# Patient Record
Sex: FEMALE | Race: ASIAN | NOT HISPANIC OR LATINO | Employment: FULL TIME | URBAN - METROPOLITAN AREA
[De-identification: names, ages, dates, MRNs, and addresses within clinical notes are randomized per-mention and may not be internally consistent; named-entity substitution may affect disease eponyms.]

---

## 2017-03-06 ENCOUNTER — OFFICE VISIT (OUTPATIENT)
Dept: MEDICAL GROUP | Facility: LAB | Age: 57
End: 2017-03-06
Payer: COMMERCIAL

## 2017-03-06 VITALS
HEART RATE: 83 BPM | TEMPERATURE: 98.7 F | WEIGHT: 124.6 LBS | BODY MASS INDEX: 25.12 KG/M2 | RESPIRATION RATE: 12 BRPM | HEIGHT: 59 IN | SYSTOLIC BLOOD PRESSURE: 124 MMHG | OXYGEN SATURATION: 96 % | DIASTOLIC BLOOD PRESSURE: 82 MMHG

## 2017-03-06 DIAGNOSIS — E78.49 OTHER HYPERLIPIDEMIA: ICD-10-CM

## 2017-03-06 DIAGNOSIS — S20.212A RIB CONTUSION, LEFT, INITIAL ENCOUNTER: ICD-10-CM

## 2017-03-06 DIAGNOSIS — R73.03 PREDIABETES: ICD-10-CM

## 2017-03-06 DIAGNOSIS — I10 ESSENTIAL HYPERTENSION: ICD-10-CM

## 2017-03-06 PROCEDURE — 99214 OFFICE O/P EST MOD 30 MIN: CPT | Performed by: NURSE PRACTITIONER

## 2017-03-06 RX ORDER — IBUPROFEN 600 MG/1
600 TABLET ORAL EVERY 8 HOURS PRN
Qty: 60 TAB | Refills: 1 | Status: SHIPPED | OUTPATIENT
Start: 2017-03-06 | End: 2017-08-06 | Stop reason: SDUPTHER

## 2017-03-06 ASSESSMENT — PATIENT HEALTH QUESTIONNAIRE - PHQ9: CLINICAL INTERPRETATION OF PHQ2 SCORE: 0

## 2017-03-06 NOTE — PROGRESS NOTES
"Chief Complaint   Patient presents with   • Fall     patient had a fall and has pain on her left side under her breast area, hurts to cough or sneeze, onset 8 days       HPI  Jojo is a 56-year-old established female here with complaint of new onset left-sided rib pain. She reports that she fell one week ago, pulled down by her dog on a walk. She reports that she fell onto the left side of her face, partially catching herself with her left knee and hand. Only residual pain is her left anterior rib area, she points just below her breast tissue. She is not having any difficulty breathing or feeling short of breath. She has pain with coughing, sneezing and laughing. She has been using ibuprofen and is requesting a prescription strength ibuprofen as well as a topical anti-inflammatory. She does have a history of silent reflux and occasionally abdominal discomfort with ibuprofen, if she takes ibuprofen on an empty stomach.      Past medical, surgical, family, and social history is reviewed and updated in Epic chart by me today.   Medications and allergies reviewed and updated in Epic chart by me today.     ROS:   Denies nausea, vomiting, diarrhea    Exam:  Blood pressure 124/82, pulse 83, temperature 37.1 °C (98.7 °F), resp. rate 12, height 1.499 m (4' 11\"), weight 56.518 kg (124 lb 9.6 oz), SpO2 96 %.  Constitutional: Alert, no distress, plus 3 vital signs  Skin:  Warm, dry.  Well-healing scabs to her MIP joints on left hand, 2 through 5. No scabbing or bleeding to her face.  Eye: Equal, round and reactive, conjunctiva clear  ENMT: Lips without lesions, good dentition, oropharynx clear. No tenderness to facial bones bilaterally. No facial deformity.   Neck: Trachea midline  Respiratory: Unlabored respiration, lungs clear to auscultation.  No areas of decreased lung sounds. No flailing.  Cardiovascular: Normal rate   Abdomen: Soft, nontender  Musculoskeletal: She does have tenderness to her distal left anterior ribs " without overlying bruising, rash or interruption in her skin. Her pain extends to her left lateral ribs but no posterior left rib pain.  Psych: Alert, pleasant, well-groomed, normal affect    A/P:  1. Rib contusion, left, initial encounter  -Discussed that we typically refrain from x-rays with no sign of pneumothorax on exam, such as decreased lung sounds. She was comfortable with refraining from x-ray. She was requesting a note for work to allow her to refrain from lifting anything heavy for a few weeks. Discussed that she needs to rest, avoid weight lifting or any contact sports as well. Discussed bracing herself with laughing, coughing, sneezing and bowel movements. Discussed GI precautions with ibuprofen but hopefully her insurance cover diclofenac gel. Follow-up in 2-3 weeks if symptoms are not dramatically improved.  - Diclofenac Sodium 1 % Gel; Apply 1 g to skin as directed 4 times a day.  Dispense: 1 Tube; Refill: 1  - ibuprofen (MOTRIN) 600 MG Tab; Take 1 Tab by mouth every 8 hours as needed for Moderate Pain.  Dispense: 60 Tab; Refill: 1    2. Essential hypertension  -Not specifically addressed today, predicted lab work for the patient and recommend she follow up for physical in May.  -Stable today. Recommend lab work and a follow-up with Dr. Odell in May after labs    3. Other hyperlipidemia  -As above.  - COMP METABOLIC PANEL; Future  - CBC WITH DIFFERENTIAL; Future  - LIPID PROFILE; Future    4. Prediabetes  -As above.  - HEMOGLOBIN A1C; Future

## 2017-03-06 NOTE — MR AVS SNAPSHOT
"        Marilou Arias Reyes   3/6/2017 8:00 AM   Office Visit   MRN: [id number]    Department:  [facility]   Dept Phone:  [phone]    Description:  Female : 1960   Provider:  DARY Boss           Reason for Visit     Fall patient had a fall and has pain on her left side under her breast area, hurts to cough or sneeze, onset 8 days      Allergies as of 3/6/2017     No Known Allergies      You were diagnosed with     Rib contusion, left, initial encounter   [014957]       Essential hypertension   [0605142]       Other hyperlipidemia   [5384182]       Prediabetes   [361114]         Vital Signs     Blood Pressure Pulse Temperature Respirations Height Weight    124/82 mmHg 83 37.1 °C (98.7 °F) 12 1.499 m (4' 11\") 56.518 kg (124 lb 9.6 oz)    Body Mass Index Oxygen Saturation Smoking Status             25.15 kg/m2 96% Never Smoker          Basic Information     Date Of Birth Sex Race Ethnicity Preferred Language    1960 Female  Unknown English      Problem List              ICD-10-CM Priority Class Noted - Resolved    Prolactinoma (CMS-HCC) D35.2   Unknown - Present    Depression F32.9   2013 - Present    Back pain M54.9   2013 - Present    Environmental allergies Z91.09   2016 - Present    Hypertension I10   2016 - Present    Hyperlipidemia E78.5   2016 - Present    Left cervical radiculopathy M54.12   2016 - Present    Chronic neck pain M54.2, G89.29   2016 - Present    Vitamin D insufficiency E55.9   2016 - Present    Allergic rhinitis due to allergen J30.9   2016 - Present    Colon polyps K63.5   2016 - Present    Prediabetes R73.03   2016 - Present    Microalbuminuria R80.9   2016 - Present    Nasal congestion R09.81   2016 - Present    Dry cough R05   2016 - Present      Health Maintenance        Date Due Completion Dates    IMM DTaP/Tdap/Td Vaccine (1 - Tdap) 10/23/1980 ---    PAP SMEAR 3/25/2016 " 3/25/2013 (Prv Comp)    Override on 3/25/2013: Previously completed    IMM INFLUENZA (1) 9/1/2016 ---    MAMMOGRAM 7/26/2017 7/26/2016, 5/2/2014, 4/26/2013, 4/24/2012, 1/8/2010, 1/8/2010, 7/24/2008, 7/24/2008    COLONOSCOPY 1/17/2019 1/17/2014            Current Immunizations     Hepatitis B Vaccine Recombivax (Adol/Adult) 5/17/2016, 11/12/2015, 10/9/2015  4:27 PM    Tuberculin Skin Test 1/3/2014 10:35 AM, 1/4/2013  2:50 PM, 1/6/2012 10:00 AM      Below and/or attached are the medications your provider expects you to take. Review all of your home medications and newly ordered medications with your provider and/or pharmacist. Follow medication instructions as directed by your provider and/or pharmacist. Please keep your medication list with you and share with your provider. Update the information when medications are discontinued, doses are changed, or new medications (including over-the-counter products) are added; and carry medication information at all times in the event of emergency situations     Allergies:  No Known Allergies          Medications  Valid as of: March 06, 2017 -  9:25 AM    Generic Name Brand Name Tablet Size Instructions for use    Albuterol Sulfate (Aero Soln) albuterol 108 (90 BASE) MCG/ACT Inhale 2 Puffs by mouth every 6 hours as needed for Shortness of Breath.        Calcium Carbonate (Tab) Calcium 1500 MG Take 1 Tab by mouth every day.        Carisoprodol (Tab) SOMA 350 MG Take 1 Tab by mouth at bedtime as needed for Muscle Spasms.        Cholecalciferol   Take 2,000 mg by mouth every day.        Citalopram Hydrobromide (Tab) CELEXA 20 MG TAKE 1 & 1/2 TABLETS BY MOUTH EVERY DAY        Diclofenac Sodium (Gel) Diclofenac Sodium 1 % Apply 1 g to skin as directed 4 times a day.        Fexofenadine HCl (Tab) ALLEGRA 180 MG Take 1 Tab by mouth every day.        Fluticasone Propionate (Suspension) FLONASE 50 MCG/ACT Spray 1 Spray in nose every day.        Glucosamine HCl   Take 1 Tab by mouth  every day.        Ibuprofen (Tab) MOTRIN 800 MG Take 1 Tab by mouth every 8 hours as needed for Mild Pain.        Ibuprofen (Tab) MOTRIN 600 MG Take 1 Tab by mouth every 8 hours as needed for Moderate Pain.        Losartan Potassium (Tab) COZAAR 50 MG TAKE ONE TABLET BY MOUTH ONE TIME DAILY        Multiple Vitamins-Minerals   Take 1 Tab by mouth every day.        Omega-3 Fatty Acids (Cap) fish oil 1000 MG Take 1,000 mg by mouth every day.        Omeprazole (CAPSULE DELAYED RELEASE) PRILOSEC 20 MG Take 1 Cap by mouth every day.        .                 Medicines prescribed today were sent to:     [id number] - [place], [place] - [address] [address]    Phone: [phone] Fax: [phone]    Open 24 Hours?: No      Medication refill instructions:       If your prescription bottle indicates you have medication refills left, it is not necessary to call your provider’s office. Please contact your pharmacy and they will refill your medication.    If your prescription bottle indicates you do not have any refills left, you may request refills at any time through one of the following ways: The online [employer] system (except Urgent Care), by calling your provider’s office, or by asking your pharmacy to contact your provider’s office with a refill request. Medication refills are processed only during regular business hours and may not be available until the next business day. Your provider may request additional information or to have a follow-up visit with you prior to refilling your medication.   *Please Note: Medication refills are assigned a new Rx number when refilled electronically. Your pharmacy may indicate that no refills were authorized even though a new prescription for the same medication is available at the pharmacy. Please request the medicine by name with the pharmacy before contacting your provider for a refill.        Your To Do List     Future Labs/Procedures Complete  By Expires    CBC WITH DIFFERENTIAL  As directed 3/7/2018    COMP METABOLIC PANEL  As directed 3/7/2018    HEMOGLOBIN A1C  As directed 3/7/2018    LIPID PROFILE  As directed 3/7/2018         MyChart Access Code: Activation code not generated  Current MyChart Status: Active

## 2017-03-06 NOTE — Clinical Note
[facility]  Sarita Odell M.D.  [address] [facility] [address]  [place] [address]  Fax: [phone]   Authorization for Release/Disclosure of   Protected Health Information   Name: MARILOU ARIAS REYES : 1960 SSN: [id number]   Address: [address] [place] [address]   [place] [address] Phone:    [phone] (home) [phone] (work)   I authorize the entity listed below to release/disclose the PHI below to:   [facility]/Sarita Odell M.D. and DARY Boss   Provider or Entity Name:  Dr. Pam Bland   Address   City, [facility], [facility]   Phone:      Fax:     Reason for request: continuity of care   Information to be released:    [  ] LAST COLONOSCOPY,  including any PATH REPORT and follow-up  [  ] LAST FIT/COLOGUARD RESULT [  ] LAST DEXA  [  ] LAST MAMMOGRAM  [ x ] LAST PAP  [  ] LAST LABS [  ] RETINA EXAM REPORT  [  ] IMMUNIZATION RECORDS  [  ] Release all info      [  ] Check here and initial the line next to each item to release ALL health information INCLUDING  _____ Care and treatment for drug and / or alcohol abuse  _____ HIV testing, infection status, or AIDS  _____ Genetic Testing    DATES OF SERVICE OR TIME PERIOD TO BE DISCLOSED: _____________  I understand and acknowledge that:  * This Authorization may be revoked at any time by you in writing, except if your health information has already been used or disclosed.  * Your health information that will be used or disclosed as a result of you signing this authorization could be re-disclosed by the recipient. If this occurs, your re-disclosed health information may no longer be protected by State or Federal laws.  * You may refuse to sign this Authorization. Your refusal will not affect your ability to obtain treatment.  * This Authorization becomes effective upon signing and will  on (date) __________.      If no date is indicated, this Authorization will  one (1) year from the signature date.    Name: Marilou Arias Reyes    Signature:   Date:     3/6/2017       PLEASE FAX REQUESTED RECORDS BACK TO: [phone]

## 2017-03-06 NOTE — Clinical Note
March 6, 2017       Patient: Marilou Arias Reyes   YOB: 1960   Date of Visit: 3/6/2017         To Whom It May Concern:    It is my medical opinion that Marilou Reyes refrain from lifting, pulling or pushing over 15 lbs for the next 3 weeks.    If you have any questions or concerns, please don't hesitate to call [phone]          Sincerely,          CHESTER Boss.  Electronically Signed

## 2017-05-26 ENCOUNTER — OFFICE VISIT (OUTPATIENT)
Dept: MEDICAL GROUP | Facility: LAB | Age: 57
End: 2017-05-26
Payer: COMMERCIAL

## 2017-05-26 VITALS
HEART RATE: 94 BPM | BODY MASS INDEX: 25 KG/M2 | OXYGEN SATURATION: 95 % | HEIGHT: 59 IN | WEIGHT: 124 LBS | DIASTOLIC BLOOD PRESSURE: 76 MMHG | SYSTOLIC BLOOD PRESSURE: 120 MMHG | TEMPERATURE: 99 F | RESPIRATION RATE: 12 BRPM

## 2017-05-26 DIAGNOSIS — R80.9 MICROALBUMINURIA: ICD-10-CM

## 2017-05-26 DIAGNOSIS — D35.2 PROLACTINOMA (HCC): ICD-10-CM

## 2017-05-26 DIAGNOSIS — F32.A DEPRESSION, UNSPECIFIED DEPRESSION TYPE: ICD-10-CM

## 2017-05-26 DIAGNOSIS — I10 ESSENTIAL HYPERTENSION: ICD-10-CM

## 2017-05-26 DIAGNOSIS — Z00.00 ROUTINE GENERAL MEDICAL EXAMINATION AT HEALTH CARE FACILITY: ICD-10-CM

## 2017-05-26 DIAGNOSIS — M25.512 ACUTE PAIN OF LEFT SHOULDER: ICD-10-CM

## 2017-05-26 DIAGNOSIS — R73.01 ELEVATED FASTING GLUCOSE: ICD-10-CM

## 2017-05-26 DIAGNOSIS — R74.01 ELEVATED ALT MEASUREMENT: ICD-10-CM

## 2017-05-26 DIAGNOSIS — R73.03 PREDIABETES: ICD-10-CM

## 2017-05-26 DIAGNOSIS — E55.9 VITAMIN D INSUFFICIENCY: ICD-10-CM

## 2017-05-26 DIAGNOSIS — E78.5 HYPERLIPIDEMIA, UNSPECIFIED HYPERLIPIDEMIA TYPE: ICD-10-CM

## 2017-05-26 PROCEDURE — 99396 PREV VISIT EST AGE 40-64: CPT | Performed by: FAMILY MEDICINE

## 2017-05-26 RX ORDER — CYCLOBENZAPRINE HCL 10 MG
10 TABLET ORAL 3 TIMES DAILY PRN
Qty: 30 TAB | Refills: 0 | Status: SHIPPED | OUTPATIENT
Start: 2017-05-26 | End: 2017-10-06

## 2017-05-26 ASSESSMENT — ENCOUNTER SYMPTOMS
NAUSEA: 0
VOMITING: 0
INSOMNIA: 0
SHORTNESS OF BREATH: 0
FEVER: 0
HEADACHES: 1

## 2017-05-26 NOTE — PROGRESS NOTES
Subjective:      Marilou Arias Reyes is a 56 y.o. female who presents with Annual Exam    Chief Complaint   Patient presents with   • Annual Exam     [facility] labs             HPI     Left shoulder pain  This is a new problem to discuss today. She states that she is having some left shoulder pain. This bothers her more at night.  Started about 2 weeks ago. No injury. A little bit of pain during the day but not as much as during the night. Certain movements will bother her shoulder. She is right-handed. She is a pharmacist.    She has a gynecologic exam scheduled for July with Dr Bland    Patient Active Problem List    Diagnosis Date Noted   • Nasal congestion 05/17/2016   • Dry cough 05/17/2016   • Prediabetes  Is not watching carbs as best she can  Most recent labs show an A1c of 6.0  04/01/2016   • Microalbuminuria  Due for labs  04/01/2016   • Environmental allergies 01/28/2016   • Hypertension  BP good . Taking medication as directed  01/28/2016   • Hyperlipidemia  Not on meds  01/28/2016   • Left cervical radiculopathy 01/28/2016   • Chronic neck pain 01/28/2016   • Vitamin D insufficiency  Taking vitamin D  01/28/2016   • Allergic rhinitis due to allergen 01/28/2016   • Colon polyps 01/28/2016   • Depression  Stable on Celexa  11/01/2013   • Back pain 11/01/2013   • Prolactinoma (CMS-HCC)  Patient was being followed by Dr Antony in the past         Family History   Problem Relation Age of Onset   • Lung Disease Mother      COPD   • Diabetes Mother    • Hypertension Mother    • Heart Disease Father    • Hypertension Father    • Cancer Sister      Breast   • Cancer Brother      Colon, prostate     Social History     Social History   • Marital Status:      Spouse Name: N/A   • Number of Children: N/A   • Years of Education: N/A     Occupational History   • Not on file.     Social History Main Topics   • Smoking status: Never Smoker    • Smokeless tobacco: Never Used   • Alcohol Use: Yes      Comment: Rare    • Drug Use: No   • Sexual Activity:     Partners: Male     Other Topics Concern   • Not on file     Social History Narrative       Current outpatient prescriptions:   •  Diclofenac Sodium 1 % Gel, Apply 1 g to skin as directed 4 times a day., Disp: 1 Tube, Rfl: 1  •  ibuprofen (MOTRIN) 600 MG Tab, Take 1 Tab by mouth every 8 hours as needed for Moderate Pain., Disp: 60 Tab, Rfl: 1  •  losartan (COZAAR) 50 MG Tab, TAKE ONE TABLET BY MOUTH ONE TIME DAILY, Disp: 90 Tab, Rfl: 0  •  omeprazole (PRILOSEC) 20 MG delayed-release capsule, Take 1 Cap by mouth every day., Disp: 30 Cap, Rfl: 3  •  citalopram (CELEXA) 20 MG Tab, TAKE 1 & 1/2 TABLETS BY MOUTH EVERY DAY, Disp: 135 Tab, Rfl: 2  •  albuterol 108 (90 BASE) MCG/ACT Aero Soln inhalation aerosol, Inhale 2 Puffs by mouth every 6 hours as needed for Shortness of Breath., Disp: 8.5 g, Rfl: 0  •  fluticasone (FLONASE) 50 MCG/ACT nasal spray, Spray 1 Spray in nose every day., Disp: 16 g, Rfl: 3  •  carisoprodol (SOMA) 350 MG Tab, Take 1 Tab by mouth at bedtime as needed for Muscle Spasms., Disp: 30 Tab, Rfl: 1  •  Cholecalciferol (VITAMIN D PO), Take 2,000 mg by mouth every day., Disp: , Rfl:   •  Calcium 1500 MG Tab, Take 1 Tab by mouth every day., Disp: , Rfl:   •  Glucosamine HCl (GLUCOSAMINE PO), Take 1 Tab by mouth every day., Disp: , Rfl:   •  Omega-3 Fatty Acids (FISH OIL) 1000 MG Cap capsule, Take 1,000 mg by mouth every day., Disp: , Rfl:   •  Multiple Vitamins-Minerals (MULTIVITAMIN PO), Take 1 Tab by mouth every day., Disp: , Rfl:   •  ibuprofen (MOTRIN) 800 MG TABS, Take 1 Tab by mouth every 8 hours as needed for Mild Pain., Disp: 30 Tab, Rfl: 3  •  fexofenadine (ALLEGRA) 180 MG tablet, Take 1 Tab by mouth every day., Disp: 90 Tab, Rfl: 3      Review of Systems   Constitutional: Negative for fever.   Eyes:        Due for eye exam    Respiratory: Negative for shortness of breath.    Cardiovascular: Negative for chest pain.   Gastrointestinal: Negative for nausea  "and vomiting.   Genitourinary: Negative for dysuria.   Musculoskeletal:        Left shoulder pain    Neurological: Positive for headaches.   Psychiatric/Behavioral: The patient does not have insomnia.           Objective:     /76 mmHg  Pulse 94  Temp(Src) 37.2 °C (99 °F)  Resp 12  Ht 1.499 m (4' 11\")  Wt 56.246 kg (124 lb)  BMI 25.03 kg/m2  SpO2 95%     Physical Exam   Constitutional: She appears well-developed and well-nourished. She is active and cooperative.  Non-toxic appearance. She does not have a sickly appearance. No distress.   HENT:   Head: Normocephalic and atraumatic.   Mouth/Throat: Uvula is midline, oropharynx is clear and moist and mucous membranes are normal.   Eyes: Conjunctivae and EOM are normal.   Neck: Carotid bruit is not present. No thyromegaly present.   Cardiovascular: Normal rate, regular rhythm and normal heart sounds.    Pulmonary/Chest: Effort normal and breath sounds normal. No tachypnea. No respiratory distress. She has no decreased breath sounds. She has no wheezes. She has no rhonchi. She has no rales.   Abdominal: Soft. She exhibits no distension. There is no hepatosplenomegaly. There is no tenderness. There is no rigidity, no rebound and no guarding.   Musculoskeletal:        Left shoulder: She exhibits decreased range of motion. She exhibits no bony tenderness and no deformity.   Patient has trouble with left shoulder flexion, abduction, internal rotation. No point tenderness to palpation   Lymphadenopathy:     She has no cervical adenopathy.   Neurological: She is alert. She is not disoriented. She displays no tremor. She displays no seizure activity. Coordination and gait normal.   Skin: Skin is warm and dry. She is not diaphoretic.   Psychiatric: She has a normal mood and affect. Her speech is normal and behavior is normal.        Labs: CBC within normal limits, fasting glucose 103, ALT 34. Cholesterol panel total 203, triglycerides 285, HDL 57, LDL 89. A1c is " 6.0.     Assessment/Plan:     1. Routine general medical examination at health care facility  Reviewed HCM. Pap is scheduled with gyn for July     2. Elevated fasting glucose  Discussed with patient. Check labs again in 3 months. Recommended she cut back on her carbohydrate intake  - HEMOGLOBIN A1C; Future    3. Microalbuminuria  Check urine micro albumin creatinine ratio with next lab draw  - MICROALBUMIN CREAT RATIO URINE; Future    4. Prediabetes  Check A1c 3 months  - HEMOGLOBIN A1C; Future    5. Essential hypertension  Controlled. No change in medication    6. Hyperlipidemia, unspecified hyperlipidemia type  Patient is not on medication at this time. We did discuss cutting back on carbohydrates due to her elevated triglycerides. Check labs again in 3 months  - HEPATIC FUNCTION PANEL; Future  - LIPID PROFILE; Future    7. Vitamin D insufficiency  Continue supplementation. Check vitamin D with next lab draw  - VITAMIN D,25 HYDROXY; Future    8. Acute pain of left shoulder  Check x-ray. May need to start physical therapy. Discussed with patient that it seems to be her supraspinatus that is bothering her  - DX-SHOULDER 2+ LEFT; Future  - cyclobenzaprine (FLEXERIL) 10 MG Tab; Take 1 Tab by mouth 3 times a day as needed.  Dispense: 30 Tab; Refill: 0    9. Elevated ALT measurement  Check liver function panel again in 3 months. May need US. Discussed weight loss   - HEPATIC FUNCTION PANEL; Future    10. Prolactinoma (CMS-HCC)  Check prolactin level with next lab draw  - PROLACTIN; Future    Follow-up after labs in 3 months

## 2017-05-26 NOTE — MR AVS SNAPSHOT
" Marilou Arias Reyes   2017 1:20 PM   Office Visit   MRN: [id number]    Department:  [facility]   Dept Phone:  [phone]    Description:  Female : 1960   Provider:  Sarita Odell M.D.           Reason for Visit     Annual Exam [facility] labs      Allergies as of 2017     No Known Allergies      You were diagnosed with     Routine general medical examination at health care facility   [088454]       Elevated fasting glucose   [698133]       Microalbuminuria   [638368]       Prediabetes   [001371]       Essential hypertension   [7193525]       Hyperlipidemia, unspecified hyperlipidemia type   [0780601]       Vitamin D insufficiency   [282512]       Acute pain of left shoulder   [1921700]       Elevated ALT measurement   [584384]       Prolactinoma (CMS-HCC)   [686848]       Depression, unspecified depression type   [5821801]         Vital Signs     Blood Pressure Pulse Temperature Respirations Height Weight    120/76 mmHg 94 37.2 °C (99 °F) 12 1.499 m (4' 11\") 56.246 kg (124 lb)    Body Mass Index Oxygen Saturation Smoking Status             25.03 kg/m2 95% Never Smoker          Basic Information     Date Of Birth Sex Race Ethnicity Preferred Language    1960 Female  Unknown English      Problem List              ICD-10-CM Priority Class Noted - Resolved    Prolactinoma (CMS-HCC) D35.2   Unknown - Present    Depression F32.9   2013 - Present    Back pain M54.9   2013 - Present    Environmental allergies Z91.09   2016 - Present    Hypertension I10   2016 - Present    Hyperlipidemia E78.5   2016 - Present    Left cervical radiculopathy M54.12   2016 - Present    Chronic neck pain M54.2, G89.29   2016 - Present    Vitamin D insufficiency E55.9   2016 - Present    Allergic rhinitis due to allergen J30.9   2016 - Present    Colon polyps K63.5   2016 - Present    Prediabetes R73.03   2016 - Present    Microalbuminuria R80.9  "  4/1/2016 - Present    Nasal congestion R09.81   5/17/2016 - Present    Dry cough R05   5/17/2016 - Present    Elevated fasting glucose R73.01   5/26/2017 - Present    Essential hypertension I10   5/26/2017 - Present    Acute pain of left shoulder M25.512   5/26/2017 - Present    Elevated ALT measurement R74.0   5/26/2017 - Present      Health Maintenance        Date Due Completion Dates    IMM DTaP/Tdap/Td Vaccine (1 - Tdap) 10/23/1980 ---    PAP SMEAR 3/25/2016 3/25/2013 (Prv Comp)    Override on 3/25/2013: Previously completed    MAMMOGRAM 7/26/2017 7/26/2016, 5/2/2014, 4/26/2013, 4/24/2012, 1/8/2010, 1/8/2010, 7/24/2008, 7/24/2008    COLONOSCOPY 1/17/2019 1/17/2014            Current Immunizations     Hepatitis B Vaccine Recombivax (Adol/Adult) 5/17/2016, 11/12/2015, 10/9/2015  4:27 PM    Tuberculin Skin Test 1/3/2014 10:35 AM, 1/4/2013  2:50 PM, 1/6/2012 10:00 AM      Below and/or attached are the medications your provider expects you to take. Review all of your home medications and newly ordered medications with your provider and/or pharmacist. Follow medication instructions as directed by your provider and/or pharmacist. Please keep your medication list with you and share with your provider. Update the information when medications are discontinued, doses are changed, or new medications (including over-the-counter products) are added; and carry medication information at all times in the event of emergency situations     Allergies:  No Known Allergies          Medications  Valid as of: May 26, 2017 -  2:21 PM    Generic Name Brand Name Tablet Size Instructions for use    Albuterol Sulfate (Aero Soln) albuterol 108 (90 BASE) MCG/ACT Inhale 2 Puffs by mouth every 6 hours as needed for Shortness of Breath.        Calcium Carbonate (Tab) Calcium 1500 MG Take 1 Tab by mouth every day.        Carisoprodol (Tab) SOMA 350 MG Take 1 Tab by mouth at bedtime as needed for Muscle Spasms.        Cholecalciferol   Take 2,000  mg by mouth every day.        Citalopram Hydrobromide (Tab) CELEXA 20 MG TAKE 1 & 1/2 TABLETS BY MOUTH EVERY DAY        Cyclobenzaprine HCl (Tab) FLEXERIL 10 MG Take 1 Tab by mouth 3 times a day as needed.        Diclofenac Sodium (Gel) Diclofenac Sodium 1 % Apply 1 g to skin as directed 4 times a day.        Fexofenadine HCl (Tab) ALLEGRA 180 MG Take 1 Tab by mouth every day.        Fluticasone Propionate (Suspension) FLONASE 50 MCG/ACT Spray 1 Spray in nose every day.        Glucosamine HCl   Take 1 Tab by mouth every day.        Ibuprofen (Tab) MOTRIN 800 MG Take 1 Tab by mouth every 8 hours as needed for Mild Pain.        Ibuprofen (Tab) MOTRIN 600 MG Take 1 Tab by mouth every 8 hours as needed for Moderate Pain.        Losartan Potassium (Tab) COZAAR 50 MG TAKE ONE TABLET BY MOUTH ONE TIME DAILY        Multiple Vitamins-Minerals   Take 1 Tab by mouth every day.        Omega-3 Fatty Acids (Cap) fish oil 1000 MG Take 1,000 mg by mouth every day.        Omeprazole (CAPSULE DELAYED RELEASE) PRILOSEC 20 MG Take 1 Cap by mouth every day.        .                 Medicines prescribed today were sent to:     [id number] - [place], [place] - [address] [address]    Phone: [phone] Fax: [phone]    Open 24 Hours?: No      Medication refill instructions:       If your prescription bottle indicates you have medication refills left, it is not necessary to call your provider’s office. Please contact your pharmacy and they will refill your medication.    If your prescription bottle indicates you do not have any refills left, you may request refills at any time through one of the following ways: The online PowerPot system (except Urgent Care), by calling your provider’s office, or by asking your pharmacy to contact your provider’s office with a refill request. Medication refills are processed only during regular business hours and may not be available until the next business day.  Your provider may request additional information or to have a follow-up visit with you prior to refilling your medication.   *Please Note: Medication refills are assigned a new Rx number when refilled electronically. Your pharmacy may indicate that no refills were authorized even though a new prescription for the same medication is available at the pharmacy. Please request the medicine by name with the pharmacy before contacting your provider for a refill.        Your To Do List     Future Labs/Procedures Complete By Expires    DX-SHOULDER 2+ LEFT  As directed 5/26/2018    HEMOGLOBIN A1C  As directed 5/26/2018    HEPATIC FUNCTION PANEL  As directed 5/26/2018    LIPID PROFILE  As directed 5/26/2018    MICROALBUMIN CREAT RATIO URINE  As directed 5/26/2018    PROLACTIN  As directed 5/26/2018    VITAMIN D,25 HYDROXY  As directed 5/26/2018         COM DEVhart Access Code: Activation code not generated  Current Suitey Status: Active

## 2017-05-31 DIAGNOSIS — R73.03 PREDIABETES: ICD-10-CM

## 2017-05-31 DIAGNOSIS — E78.49 OTHER HYPERLIPIDEMIA: ICD-10-CM

## 2017-06-01 ENCOUNTER — HOSPITAL ENCOUNTER (OUTPATIENT)
Dept: RADIOLOGY | Facility: MEDICAL CENTER | Age: 57
End: 2017-06-01
Attending: FAMILY MEDICINE
Payer: COMMERCIAL

## 2017-06-01 ENCOUNTER — TELEPHONE (OUTPATIENT)
Dept: MEDICAL GROUP | Facility: LAB | Age: 57
End: 2017-06-01

## 2017-06-01 DIAGNOSIS — M25.512 ACUTE PAIN OF LEFT SHOULDER: ICD-10-CM

## 2017-06-01 PROCEDURE — 73030 X-RAY EXAM OF SHOULDER: CPT | Mod: LT

## 2017-06-01 NOTE — TELEPHONE ENCOUNTER
Clinical Ink Released Result Comments      Entered by Sarita Odell M.D. at 6/1/2017 11:53 AM     Read by Marilou Arias Reyes at 6/1/2017  1:00 PM     X-ray shows moderate arthritis in the shoulder. Also shows a chronic fracture at the distal third of the clavicle. Would like to refer you to orthopedics     Sent my chart asking patient if referral was ok

## 2017-06-01 NOTE — TELEPHONE ENCOUNTER
----- Message from Sarita Odell M.D. sent at 6/1/2017 11:53 AM PDT -----  X-ray shows moderate arthritis in the shoulder. Also shows a chronic fracture at the distal third of the clavicle. Would like to refer you to orthopedics.

## 2017-06-12 RX ORDER — LOSARTAN POTASSIUM 50 MG/1
TABLET ORAL
Qty: 90 TAB | Refills: 0 | Status: SHIPPED | OUTPATIENT
Start: 2017-06-12 | End: 2017-09-19 | Stop reason: SDUPTHER

## 2017-06-27 DIAGNOSIS — G89.29 CHRONIC NECK PAIN: ICD-10-CM

## 2017-06-27 DIAGNOSIS — M54.2 CHRONIC NECK PAIN: ICD-10-CM

## 2017-06-27 RX ORDER — CARISOPRODOL 350 MG/1
350 TABLET ORAL NIGHTLY PRN
Qty: 30 TAB | Refills: 1 | Status: SHIPPED
Start: 2017-06-27 | End: 2017-10-04 | Stop reason: SDUPTHER

## 2017-06-27 NOTE — TELEPHONE ENCOUNTER
Was the patient seen in the last year in this department? Yes  not on / last fill [facility]/ patient never picked up RX written 1/28/16    Does patient have an active prescription for medications requested? No     Received Request Via: Patient

## 2017-06-27 NOTE — TELEPHONE ENCOUNTER
From     Marilou Arias Reyes      To     Sabattus Leigh Ann Robbins Ma      Sent     6/27/2017  3:17 PM            Hi Dr Odell! I requested a prescription for soma 3 weeks ago but I never heard anything, Flexeril is not helping me for my pain and spasms. Can you please send an rx for my soma at the pharmacy? We agreed that if flexeril is not helping then will switch to soma. can you do twice daily?   Thank you, Jojo

## 2017-07-28 ENCOUNTER — APPOINTMENT (OUTPATIENT)
Dept: RADIOLOGY | Facility: MEDICAL CENTER | Age: 57
End: 2017-07-28
Attending: OBSTETRICS & GYNECOLOGY
Payer: COMMERCIAL

## 2017-08-15 ENCOUNTER — HOSPITAL ENCOUNTER (OUTPATIENT)
Dept: RADIOLOGY | Facility: MEDICAL CENTER | Age: 57
End: 2017-08-15
Attending: OBSTETRICS & GYNECOLOGY
Payer: COMMERCIAL

## 2017-08-15 DIAGNOSIS — Z12.31 ENCOUNTER FOR MAMMOGRAM TO ESTABLISH BASELINE MAMMOGRAM: ICD-10-CM

## 2017-08-15 PROCEDURE — G0202 SCR MAMMO BI INCL CAD: HCPCS

## 2017-09-20 RX ORDER — LOSARTAN POTASSIUM 50 MG/1
TABLET ORAL
Qty: 90 TAB | Refills: 0 | Status: SHIPPED | OUTPATIENT
Start: 2017-09-20 | End: 2018-01-28 | Stop reason: SDUPTHER

## 2017-09-20 NOTE — TELEPHONE ENCOUNTER
Was the patient seen in the last year in this department? Yes     Does patient have an active prescription for medications requested? No     Received Request Via: Pharmacy     Last visit:5/26/17

## 2017-09-27 LAB
25(OH)D3+25(OH)D2 SERPL-MCNC: 40 NG/ML (ref 30–100)
ALBUMIN SERPL-MCNC: 4.7 G/DL (ref 3.5–5.5)
ALBUMIN/CREAT UR: 9.2 MG/G CREAT (ref 0–30)
ALP SERPL-CCNC: 56 IU/L (ref 39–117)
ALT SERPL-CCNC: 27 IU/L (ref 0–32)
AST SERPL-CCNC: 26 IU/L (ref 0–40)
BILIRUB DIRECT SERPL-MCNC: 0.1 MG/DL (ref 0–0.4)
BILIRUB SERPL-MCNC: 0.4 MG/DL (ref 0–1.2)
CHOLEST SERPL-MCNC: 219 MG/DL (ref 100–199)
COMMENT 011824: ABNORMAL
CREAT UR-MCNC: 240.2 MG/DL
HBA1C MFR BLD: 5.9 % (ref 4.8–5.6)
HDLC SERPL-MCNC: 54 MG/DL
LDLC SERPL CALC-MCNC: 101 MG/DL (ref 0–99)
MICROALBUMIN UR-MCNC: 22 UG/ML
PROLACTIN SERPL-MCNC: 37.9 NG/ML (ref 4.8–23.3)
PROT SERPL-MCNC: 7.5 G/DL (ref 6–8.5)
TRIGL SERPL-MCNC: 322 MG/DL (ref 0–149)
VLDLC SERPL CALC-MCNC: 64 MG/DL (ref 5–40)

## 2017-10-04 DIAGNOSIS — M54.2 CHRONIC NECK PAIN: ICD-10-CM

## 2017-10-04 DIAGNOSIS — G89.29 CHRONIC NECK PAIN: ICD-10-CM

## 2017-10-04 RX ORDER — CARISOPRODOL 350 MG/1
350 TABLET ORAL NIGHTLY PRN
Qty: 30 TAB | Refills: 1 | Status: SHIPPED
Start: 2017-10-04 | End: 2017-12-26 | Stop reason: SDUPTHER

## 2017-10-04 NOTE — TELEPHONE ENCOUNTER
Was the patient seen in the last year in this department? Yes last office visit 5/26/17,  8/24/17 qty: 30    Does patient have an active prescription for medications requested? No     Received Request Via: Pharmacy

## 2017-10-06 ENCOUNTER — OFFICE VISIT (OUTPATIENT)
Dept: MEDICAL GROUP | Facility: LAB | Age: 57
End: 2017-10-06
Payer: COMMERCIAL

## 2017-10-06 VITALS
HEIGHT: 59 IN | HEART RATE: 92 BPM | OXYGEN SATURATION: 93 % | TEMPERATURE: 98.1 F | WEIGHT: 121 LBS | RESPIRATION RATE: 12 BRPM | SYSTOLIC BLOOD PRESSURE: 138 MMHG | DIASTOLIC BLOOD PRESSURE: 84 MMHG | BODY MASS INDEX: 24.39 KG/M2

## 2017-10-06 DIAGNOSIS — R51.9 FRONTAL HEADACHE: ICD-10-CM

## 2017-10-06 DIAGNOSIS — R74.01 ELEVATED ALT MEASUREMENT: ICD-10-CM

## 2017-10-06 DIAGNOSIS — R73.03 PREDIABETES: ICD-10-CM

## 2017-10-06 DIAGNOSIS — M65.341 TRIGGER RING FINGER OF RIGHT HAND: ICD-10-CM

## 2017-10-06 DIAGNOSIS — D35.2 PROLACTINOMA (HCC): ICD-10-CM

## 2017-10-06 DIAGNOSIS — R63.5 WEIGHT GAIN: ICD-10-CM

## 2017-10-06 PROCEDURE — 99214 OFFICE O/P EST MOD 30 MIN: CPT | Performed by: FAMILY MEDICINE

## 2017-10-06 RX ORDER — METHYLPREDNISOLONE 4 MG/1
TABLET ORAL
Qty: 21 TAB | Refills: 0 | Status: SHIPPED | OUTPATIENT
Start: 2017-10-06 | End: 2017-12-08

## 2017-10-06 RX ORDER — CODEINE PHOSPHATE AND GUAIFENESIN 10; 100 MG/5ML; MG/5ML
5 SOLUTION ORAL EVERY 4 HOURS PRN
Qty: 240 ML | Refills: 0 | Status: SHIPPED | OUTPATIENT
Start: 2017-10-06 | End: 2017-12-08 | Stop reason: SDUPTHER

## 2017-10-06 RX ORDER — MELOXICAM 15 MG/1
15 TABLET ORAL DAILY
Qty: 90 TAB | Refills: 1 | Status: SHIPPED | OUTPATIENT
Start: 2017-10-06 | End: 2018-07-26 | Stop reason: SDUPTHER

## 2017-10-06 RX ORDER — PHENTERMINE HYDROCHLORIDE 15 MG/1
15 CAPSULE ORAL EVERY MORNING
Qty: 30 CAP | Refills: 1 | Status: SHIPPED | OUTPATIENT
Start: 2017-10-06 | End: 2018-08-28

## 2017-10-06 NOTE — LETTER
October 6, 2017        Jojo Call Reyes  [address] [place] [address]        To whom it may concern:    Jojo was seen in my office. She is free of communicable diseases.    If you have any questions or concerns, please don't hesitate to call.        Sincerely,        Magali Saab M.D.    Electronically Signed

## 2017-10-06 NOTE — PROGRESS NOTES
Subjective:   Marilou Arias Reyes is a 56 y.o. female here today for   Chief Complaint   Patient presents with   • Results     labs   • Hand Pain     right, x 1 month     Established patient of Dr. Odell.     1. Prediabetes  This is chronic. Patient has had elevated fasting glucose and elevated hemoglobin A1c on previous labs. Her most recent hemoglobin A1c is 5.9%. Her diet as well as sweets. Her weight is stable but states that she has been on phentermine for the last 2 months that she is using intermittently.  Results for REYES, MARILOU ARIAS (MRN [id number]) as of 10/6/2017 11:21   Ref. Range 9/26/2017 08:08   Glycohemoglobin Latest Ref Range: 4.8 - 5.6 % 5.9 (H)     2. Prolactinoma (CMS-HCC)  Chronic. This was found in her 30s while trying to conceive. She is now postmenopausal. Her prolactin level is slightly elevated. She has had a mild frontal headache that she thinks is associated with her sinuses over the last 2 weeks. She denies any vision changes, gait changes, mood changes, lactation from her nipples.  Results for REYES, MARILOU ARIAS (MRN [id number]) as of 10/6/2017 11:21   Ref. Range 9/26/2017 08:08   Prolactin Latest Ref Range: 4.8 - 23.3 ng/mL 37.9 (H)     3. Elevated ALT measurement  Chronic. Repeat labs show resolution of his elevated ALT.    4. Frontal headache  New to discussed. 2 weeks of frontal headache that does not radiate. No vision changes, no throbbing, no nausea or vomiting, no weakness or numbness.    5. Trigger ring finger of right hand  New to discuss. Patient reports right hand pain and locking in her ring finger. She does use her hands daily. It improves with stretching and worsens at the end of the day. This is been going on for about one month. She has never had anything like this before. She has been taking ibuprofen 800 mg which helped slightly.    6. Weight gain  New to discuss. She did see another physician who prescribed phentermine for the last 2 months. She is inquiring about  repeat prescription for this. She does not have a history of cardiovascular disease. She does have a history of hypertension. She is worried about her weight affecting her prediabetes. She states that she is only using the phentermine about once a week      Current medicines (including changes today)  Current Outpatient Prescriptions   Medication Sig Dispense Refill   • MethylPREDNISolone (MEDROL DOSEPAK) 4 MG Tablet Therapy Pack As directed on the packaging label. 21 Tab 0   • guaifenesin-codeine (CHERATUSSIN AC) Solution oral solution Take 5 mL by mouth every four hours as needed for Cough. 240 mL 0   • phentermine 15 MG capsule Take 1 Cap by mouth every morning. 30 Cap 1   • meloxicam (MOBIC) 15 MG tablet Take 1 Tab by mouth every day. 90 Tab 1   • carisoprodol (SOMA) 350 MG Tab Take 1 Tab by mouth at bedtime as needed for Muscle Spasms. 30 Tab 1   • losartan (COZAAR) 50 MG Tab TAKE ONE TABLET BY MOUTH ONE TIME DAILY 90 Tab 0   • omeprazole (PRILOSEC) 20 MG delayed-release capsule TAKE ONE CAPSULE BY MOUTH ONE TIME DAILY 30 Cap 11   • Diclofenac Sodium 1 % Gel Apply 1 g to skin as directed 4 times a day. 1 Tube 1   • citalopram (CELEXA) 20 MG Tab TAKE 1 & 1/2 TABLETS BY MOUTH EVERY  Tab 2   • fluticasone (FLONASE) 50 MCG/ACT nasal spray Spray 1 Spray in nose every day. 16 g 3   • Cholecalciferol (VITAMIN D PO) Take 2,000 mg by mouth every day.     • Calcium 1500 MG Tab Take 1 Tab by mouth every day.     • Glucosamine HCl (GLUCOSAMINE PO) Take 1 Tab by mouth every day.     • Omega-3 Fatty Acids (FISH OIL) 1000 MG Cap capsule Take 1,000 mg by mouth every day.     • Multiple Vitamins-Minerals (MULTIVITAMIN PO) Take 1 Tab by mouth every day.     • ibuprofen (MOTRIN) 800 MG TABS Take 1 Tab by mouth every 8 hours as needed for Mild Pain. 30 Tab 3   • albuterol 108 (90 BASE) MCG/ACT Aero Soln inhalation aerosol Inhale 2 Puffs by mouth every 6 hours as needed for Shortness of Breath. 8.5 g 0   • fexofenadine  "(ALLEGRA) 180 MG tablet Take 1 Tab by mouth every day. 90 Tab 3     No current facility-administered medications for this visit.      She  has a past medical history of Gestational diabetes and Prolactinoma (CMS-[facility]).    ROS   No fevers  No bowel changes  No LE edema       Objective:     Blood pressure 138/84, pulse 92, temperature 36.7 °C (98.1 °F), resp. rate 12, height 1.499 m (4' 11\"), weight 54.9 kg (121 lb), SpO2 93 %. Body mass index is 24.44 kg/m².   Physical Exam:  Constitutional: Alert, no distress.  Skin: Warm, dry, good turgor, no rashes in visible areas.  Eye: Equal, round and reactive, conjunctiva clear, lids normal.  ENMT: Lips without lesions, good dentition, oropharynx clear.  Neck: Trachea midline, no masses, no thyromegaly. No cervical or supraclavicular lymphadenopathy  Respiratory: Unlabored respiratory effort, lungs clear to auscultation, no wheezes, no ronchi.  Cardiovascular: Normal S1, S2, RRR, no murmur, no edema.  Abdomen: Soft, non-tender, no masses, no hepatosplenomegaly.  Psych: Alert and oriented x3, normal affect and mood.        Assessment and Plan:   The following treatment plan was discussed    1. Prediabetes  Chronic, currently stable. We discussed lifestyle changes and long-term issues that arise with diabetes. Patient declines nutrition counseling. Recheck in 4-6 months    2. Prolactinoma (CMS-HCC)  Chronic, patient was seen by Dr. Gomez in the past for this. Her prolactin level has increased, however, she is also postmenopausal. She has never had surgery for her prolactinoma and was only on medication. She is off of all medication for this now. We will need to monitor this closely. We did discuss symptoms that would arise that would prompt urgent medical attention    3. Elevated ALT measurement  Chronic, resolved, labs reviewed    4. Frontal headache  New, likely sinus headache. I have encouraged the patient to restart her Flonase and return if no improvement    5. Trigger ring " finger of right hand  New, stretching exercises and massage discussed. Medrol Dosepak given. Discussed injections if no improvement    6. Weight gain  Chronic, improved with phentermine use. I refilled this once for her with checking the . Patient understands side effects including elevated heart rate, insomnia, mood changes, hypertension, cardiovascular disease. We need to check her blood pressure regularly. Have urged her to come back in within the next 2 months.  - phentermine 15 MG capsule; Take 1 Cap by mouth every morning.  Dispense: 30 Cap; Refill: 1      Followup: Return in about 2 months (around 12/6/2017) for weight management, Dr. Odell.       This note was created using voice recognition software. I have made every reasonable attempt to correct errors, however, I do anticipate some grammatical errors.

## 2017-12-08 ENCOUNTER — OFFICE VISIT (OUTPATIENT)
Dept: MEDICAL GROUP | Facility: LAB | Age: 57
End: 2017-12-08
Payer: COMMERCIAL

## 2017-12-08 VITALS
HEART RATE: 96 BPM | HEIGHT: 59 IN | DIASTOLIC BLOOD PRESSURE: 82 MMHG | SYSTOLIC BLOOD PRESSURE: 138 MMHG | TEMPERATURE: 98.3 F | BODY MASS INDEX: 23.99 KG/M2 | WEIGHT: 119 LBS | OXYGEN SATURATION: 95 % | RESPIRATION RATE: 12 BRPM

## 2017-12-08 DIAGNOSIS — J01.10 ACUTE FRONTAL SINUSITIS, RECURRENCE NOT SPECIFIED: ICD-10-CM

## 2017-12-08 DIAGNOSIS — R05.8 PRODUCTIVE COUGH: ICD-10-CM

## 2017-12-08 DIAGNOSIS — M54.9 BACK PAIN, UNSPECIFIED BACK LOCATION, UNSPECIFIED BACK PAIN LATERALITY, UNSPECIFIED CHRONICITY: ICD-10-CM

## 2017-12-08 PROCEDURE — 99214 OFFICE O/P EST MOD 30 MIN: CPT | Performed by: FAMILY MEDICINE

## 2017-12-08 RX ORDER — CODEINE PHOSPHATE AND GUAIFENESIN 10; 100 MG/5ML; MG/5ML
5 SOLUTION ORAL EVERY 4 HOURS PRN
Qty: 240 ML | Refills: 0 | Status: SHIPPED | OUTPATIENT
Start: 2017-12-08 | End: 2018-03-01 | Stop reason: SDUPTHER

## 2017-12-08 RX ORDER — IBUPROFEN 800 MG/1
800 TABLET ORAL EVERY 8 HOURS PRN
Qty: 30 TAB | Refills: 3 | Status: SHIPPED | OUTPATIENT
Start: 2017-12-08 | End: 2019-06-20 | Stop reason: SDUPTHER

## 2017-12-08 RX ORDER — AMOXICILLIN AND CLAVULANATE POTASSIUM 875; 125 MG/1; MG/1
1 TABLET, FILM COATED ORAL 2 TIMES DAILY
Qty: 20 TAB | Refills: 0 | Status: SHIPPED | OUTPATIENT
Start: 2017-12-08 | End: 2018-08-28

## 2017-12-08 ASSESSMENT — ENCOUNTER SYMPTOMS
FEVER: 1
HEADACHES: 1
MYALGIAS: 1
DIARRHEA: 1
CHILLS: 0
SHORTNESS OF BREATH: 1
VOMITING: 0
NAUSEA: 1
COUGH: 1

## 2017-12-08 NOTE — PROGRESS NOTES
Subjective:      Marilou Arias Reyes is a 57 y.o. female who presents with Cough (X 6 days); Nasal Congestion; and Fever    Chief Complaint   Patient presents with   • Cough     X 6 days   • Nasal Congestion   • Fever             HPI    Patient reports that her symptoms started on Saturday. This is new to discuss today. She thinks that she had something similar before Thanksgiving however that resolved. She states that on Saturday she started to have some chest congestion and sinus congestion and cough. Cough is slightly productive. She states on Monday and Tuesday she did have a fever, highest to 101. She had an old prescription for a Z-Bruce so she's been taking azithromycin 500 mg for the past 3 days and does not feel any better. Reports that she has been using Flonase. Does have some pain in the forehead on the right side and also had some pain in the right cheek a couple of days ago. She denies any tooth pain currently. Does have some sore throat on occasion. Did not get a flu vaccine. She is requesting a refill of Cheratussin AC for her cough. She uses this at night    She also would like to have a refill of ibuprofen that she takes for her back pain.    Review of Systems   Constitutional: Positive for fever. Negative for chills.   HENT: Positive for ear pain.    Respiratory: Positive for cough and shortness of breath (with coughing).    Gastrointestinal: Positive for diarrhea and nausea. Negative for vomiting.   Genitourinary: Negative for dysuria.   Musculoskeletal: Positive for myalgias.   Neurological: Positive for headaches.   Endo/Heme/Allergies: Negative for environmental allergies.     Patient Active Problem List   Diagnosis   • Prolactinoma (CMS-HCC)   • Depression   • Back pain   • Environmental allergies   • Hypertension   • Hyperlipidemia   • Left cervical radiculopathy   • Chronic neck pain   • Vitamin D insufficiency   • Allergic rhinitis due to allergen   • Colon polyps   • Prediabetes   •  Microalbuminuria   • Nasal congestion   • Dry cough   • Elevated fasting glucose   • Essential hypertension   • Acute pain of left shoulder   • Elevated ALT measurement     Family History   Problem Relation Age of Onset   • Lung Disease Mother      COPD   • Diabetes Mother    • Hypertension Mother    • Heart Disease Father    • Hypertension Father    • Cancer Sister      Breast   • Cancer Brother      Colon, prostate     Social History     Social History   • Marital status:      Spouse name: N/A   • Number of children: N/A   • Years of education: N/A     Occupational History   • Not on file.     Social History Main Topics   • Smoking status: Never Smoker   • Smokeless tobacco: Never Used   • Alcohol use Yes      Comment: Rare   • Drug use: No   • Sexual activity: Yes     Partners: Male     Other Topics Concern   • Not on file     Social History Narrative   • No narrative on file       Current Outpatient Prescriptions:   •  guaifenesin-codeine (CHERATUSSIN AC) Solution oral solution, Take 5 mL by mouth every four hours as needed for Cough., Disp: 240 mL, Rfl: 0  •  amoxicillin-clavulanate (AUGMENTIN) 875-125 MG Tab, Take 1 Tab by mouth 2 times a day., Disp: 20 Tab, Rfl: 0  •  ibuprofen (MOTRIN) 800 MG Tab, Take 1 Tab by mouth every 8 hours as needed for Mild Pain., Disp: 30 Tab, Rfl: 3  •  phentermine 15 MG capsule, Take 1 Cap by mouth every morning., Disp: 30 Cap, Rfl: 1  •  meloxicam (MOBIC) 15 MG tablet, Take 1 Tab by mouth every day., Disp: 90 Tab, Rfl: 1  •  carisoprodol (SOMA) 350 MG Tab, Take 1 Tab by mouth at bedtime as needed for Muscle Spasms., Disp: 30 Tab, Rfl: 1  •  losartan (COZAAR) 50 MG Tab, TAKE ONE TABLET BY MOUTH ONE TIME DAILY, Disp: 90 Tab, Rfl: 0  •  omeprazole (PRILOSEC) 20 MG delayed-release capsule, TAKE ONE CAPSULE BY MOUTH ONE TIME DAILY, Disp: 30 Cap, Rfl: 11  •  Diclofenac Sodium 1 % Gel, Apply 1 g to skin as directed 4 times a day., Disp: 1 Tube, Rfl: 1  •  citalopram (CELEXA) 20  "MG Tab, TAKE 1 & 1/2 TABLETS BY MOUTH EVERY DAY, Disp: 135 Tab, Rfl: 2  •  albuterol 108 (90 BASE) MCG/ACT Aero Soln inhalation aerosol, Inhale 2 Puffs by mouth every 6 hours as needed for Shortness of Breath., Disp: 8.5 g, Rfl: 0  •  fluticasone (FLONASE) 50 MCG/ACT nasal spray, Spray 1 Spray in nose every day., Disp: 16 g, Rfl: 3  •  Cholecalciferol (VITAMIN D PO), Take 2,000 mg by mouth every day., Disp: , Rfl:   •  Calcium 1500 MG Tab, Take 1 Tab by mouth every day., Disp: , Rfl:   •  Glucosamine HCl (GLUCOSAMINE PO), Take 1 Tab by mouth every day., Disp: , Rfl:   •  Omega-3 Fatty Acids (FISH OIL) 1000 MG Cap capsule, Take 1,000 mg by mouth every day., Disp: , Rfl:   •  Multiple Vitamins-Minerals (MULTIVITAMIN PO), Take 1 Tab by mouth every day., Disp: , Rfl:   •  fexofenadine (ALLEGRA) 180 MG tablet, Take 1 Tab by mouth every day., Disp: 90 Tab, Rfl: 3         Objective:     /82   Pulse 96   Temp 36.8 °C (98.3 °F)   Resp 12   Ht 1.499 m (4' 11\")   Wt 54 kg (119 lb)   SpO2 95%   BMI 24.04 kg/m²      Physical Exam   Constitutional: She appears well-developed and well-nourished. She is active and cooperative.  Non-toxic appearance. She does not appear ill.   Coughing on occasion    HENT:   Head: Normocephalic and atraumatic.   Nose: Mucosal edema present. Right sinus exhibits frontal sinus tenderness. Right sinus exhibits no maxillary sinus tenderness. Left sinus exhibits no maxillary sinus tenderness.   Mouth/Throat: Uvula is midline, oropharynx is clear and moist and mucous membranes are normal.   Light reflex slightly dull bilaterally   Eyes: Conjunctivae and EOM are normal.   Cardiovascular: Normal rate, regular rhythm and normal heart sounds.    Pulmonary/Chest: Effort normal and breath sounds normal. No tachypnea. No respiratory distress. She has no decreased breath sounds. She has no wheezes. She has no rhonchi. She has no rales.   Abdominal: She exhibits no distension. There is no tenderness. " There is no rigidity, no rebound and no guarding.   Neurological: She is alert. She is not disoriented. She displays no tremor. Coordination and gait normal.   Skin: Skin is warm and dry. She is not diaphoretic.   Psychiatric: She has a normal mood and affect. Her speech is normal and behavior is normal. She is not actively hallucinating. She is attentive.               Assessment/Plan:     1. Acute frontal sinusitis, recurrence not specified  - amoxicillin-clavulanate (AUGMENTIN) 875-125 MG Tab; Take 1 Tab by mouth 2 times a day.  Dispense: 20 Tab; Refill: 0    2. Productive cough  - guaifenesin-codeine (CHERATUSSIN AC) Solution oral solution; Take 5 mL by mouth every four hours as needed for Cough.  Dispense: 240 mL; Refill: 0    3. Back pain, unspecified back location, unspecified back pain laterality, unspecified chronicity  - ibuprofen (MOTRIN) 800 MG Tab; Take 1 Tab by mouth every 8 hours as needed for Mild Pain.  Dispense: 30 Tab; Refill: 3    Discussed with patient that cannot r/o influenza however her symptoms started almost a week ago and will not test her today.  Will treat her for a frontal sinusitis with Augmentin one tablet twice daily for 7-10 days. She is to use the Cheratussin as needed for her cough. She is to let me know if she does not feel better after treatment.    Please note that this dictation was created using voice recognition software. I have made every reasonable attempt to correct obvious errors, but I expect that there are errors of grammar and possibly content that I did not discover before finalizing the note.

## 2017-12-08 NOTE — LETTER
December 8, 2017       Patient: Marilou Arias Reyes   YOB: 1960   Date of Visit: 12/8/2017         To Whom It May Concern:    It is my medical opinion that Marilou Reyes remain off work today, December 8, 2017, due to medical reasons .    If you have any questions or concerns, please don't hesitate to call [phone]          Sincerely,          Sarita Odell M.D.  Electronically Signed

## 2017-12-13 ENCOUNTER — NON-PROVIDER VISIT (OUTPATIENT)
Dept: URGENT CARE | Facility: CLINIC | Age: 57
End: 2017-12-13

## 2017-12-13 DIAGNOSIS — Z11.1 PPD SCREENING TEST: ICD-10-CM

## 2017-12-13 PROCEDURE — 86580 TB INTRADERMAL TEST: CPT | Performed by: PHYSICIAN ASSISTANT

## 2017-12-15 ENCOUNTER — NON-PROVIDER VISIT (OUTPATIENT)
Dept: URGENT CARE | Facility: CLINIC | Age: 57
End: 2017-12-15

## 2017-12-15 DIAGNOSIS — Z11.1 ENCOUNTER FOR PPD SKIN TEST READING: ICD-10-CM

## 2017-12-15 LAB — TB WHEAL 3D P 5 TU DIAM: NORMAL MM

## 2017-12-15 NOTE — NON-PROVIDER
Marilou Arias Reyes is a 57 y.o. female here for a non-provider visit for PPD reading -- Step 1 of 1.      1.  Resulted in Epic under enter/edit results? Yes   2.  TB evaluation questionnaire scanned into chart and original given to patient?Yes      3. Was induration greater than 0 mm? No.    If Step 1 of 2, when is patient returning for second step (delete if N/A): 1 of 1    Routed to PCP? No

## 2017-12-26 DIAGNOSIS — G89.29 CHRONIC NECK PAIN: ICD-10-CM

## 2017-12-26 DIAGNOSIS — M54.2 CHRONIC NECK PAIN: ICD-10-CM

## 2017-12-27 RX ORDER — CARISOPRODOL 350 MG/1
TABLET ORAL
Qty: 30 TAB | Refills: 0 | Status: SHIPPED
Start: 2017-12-27 | End: 2018-02-07 | Stop reason: SDUPTHER

## 2017-12-27 NOTE — TELEPHONE ENCOUNTER
Was the patient seen in the last year in this department? Yes     Does patient have an active prescription for medications requested? No     Received Request Via: Pharmacy     Per  last fill: 10-4-17 # 30

## 2018-01-29 RX ORDER — LOSARTAN POTASSIUM 50 MG/1
TABLET ORAL
Qty: 90 TAB | Refills: 2 | Status: SHIPPED | OUTPATIENT
Start: 2018-01-29 | End: 2018-11-24 | Stop reason: SDUPTHER

## 2018-02-06 ENCOUNTER — OFFICE VISIT (OUTPATIENT)
Dept: MEDICAL GROUP | Facility: LAB | Age: 58
End: 2018-02-06
Payer: COMMERCIAL

## 2018-02-06 VITALS
RESPIRATION RATE: 12 BRPM | BODY MASS INDEX: 23.99 KG/M2 | OXYGEN SATURATION: 96 % | HEIGHT: 59 IN | SYSTOLIC BLOOD PRESSURE: 130 MMHG | WEIGHT: 119 LBS | HEART RATE: 72 BPM | TEMPERATURE: 99.7 F | DIASTOLIC BLOOD PRESSURE: 72 MMHG

## 2018-02-06 DIAGNOSIS — M25.512 ACUTE PAIN OF LEFT SHOULDER: ICD-10-CM

## 2018-02-06 DIAGNOSIS — M79.641 RIGHT HAND PAIN: ICD-10-CM

## 2018-02-06 PROCEDURE — 99214 OFFICE O/P EST MOD 30 MIN: CPT | Performed by: FAMILY MEDICINE

## 2018-02-06 RX ORDER — TRAMADOL HYDROCHLORIDE 50 MG/1
50 TABLET ORAL EVERY 8 HOURS PRN
Qty: 30 TAB | Refills: 0 | Status: SHIPPED | OUTPATIENT
Start: 2018-02-06 | End: 2018-03-06 | Stop reason: SDUPTHER

## 2018-02-06 ASSESSMENT — PATIENT HEALTH QUESTIONNAIRE - PHQ9
5. POOR APPETITE OR OVEREATING: 0 - NOT AT ALL
SUM OF ALL RESPONSES TO PHQ QUESTIONS 1-9: 8
CLINICAL INTERPRETATION OF PHQ2 SCORE: 4

## 2018-02-06 NOTE — PROGRESS NOTES
Chief Complaint   Patient presents with   • Hand Pain     X3 month/ right hand   • Shoulder Pain     X 1 month   • Depression     score 8       HISTORY OF PRESENT ILLNESS: Patient is a 57 y.o. female established patient who presents today to follow up     Hand pain   This is an ongoing issue. This is the right hand and she is right handed. She stared with a trigger finger and she feels that there is a bone on the palmar side of the right hand that is pushing on the palmar surface. Ibuprofen does not help. She has tried voltaren gel and that did not help. No pain in the wrist. This has been ongoing for the past 3 month.     Shoulder pain   This is new to discuss today. Her left shoulder is bothering her. This started about a month ago. States that the pain in the lateral region of the shoulder. Like someone is poking it. She did fall and landed on the left side and she was told there was a crack on the collar bone. This happened about a month ago. States that when she moves the wrong way she will feel the shoulder pop. This has been going on for a month.      Depression   Patients depression score of 8. Thinks that maybe some time she has depression. She thinks that this is due to pain. No suicidal  thoughts     Interpretation of PHQ-9 Total Score   Score Severity   1-4 No Depression   5-9 Mild Depression   10-14 Moderate Depression   15-19 Moderately Severe Depression   20-27 Severe Depression      Patient Active Problem List    Diagnosis Date Noted   • Elevated fasting glucose 05/26/2017   • Essential hypertension 05/26/2017   • Acute pain of left shoulder 05/26/2017   • Elevated ALT measurement 05/26/2017   • Nasal congestion 05/17/2016   • Dry cough 05/17/2016   • Prediabetes 04/01/2016   • Microalbuminuria 04/01/2016   • Environmental allergies 01/28/2016   • Hypertension 01/28/2016   • Hyperlipidemia 01/28/2016   • Left cervical radiculopathy 01/28/2016   • Chronic neck pain 01/28/2016   • Vitamin D  insufficiency 01/28/2016   • Allergic rhinitis due to allergen 01/28/2016   • Colon polyps 01/28/2016   • Depression 11/01/2013   • Back pain 11/01/2013   • Prolactinoma (CMS-[facility])         Allergies:Patient has no known allergies.    Current Outpatient Prescriptions   Medication Sig Dispense Refill   • losartan (COZAAR) 50 MG Tab TAKE ONE TABLET BY MOUTH ONE TIME DAILY 90 Tab 2   • albuterol 108 (90 Base) MCG/ACT Aero Soln inhalation aerosol Inhale 2 Puffs by mouth every 6 hours as needed for Shortness of Breath. 8.5 g 1   • guaifenesin-codeine (CHERATUSSIN AC) Solution oral solution Take 5 mL by mouth every four hours as needed for Cough. 240 mL 0   • amoxicillin-clavulanate (AUGMENTIN) 875-125 MG Tab Take 1 Tab by mouth 2 times a day. 20 Tab 0   • ibuprofen (MOTRIN) 800 MG Tab Take 1 Tab by mouth every 8 hours as needed for Mild Pain. 30 Tab 3   • phentermine 15 MG capsule Take 1 Cap by mouth every morning. 30 Cap 1   • meloxicam (MOBIC) 15 MG tablet Take 1 Tab by mouth every day. 90 Tab 1   • omeprazole (PRILOSEC) 20 MG delayed-release capsule TAKE ONE CAPSULE BY MOUTH ONE TIME DAILY 30 Cap 11   • Diclofenac Sodium 1 % Gel Apply 1 g to skin as directed 4 times a day. 1 Tube 1   • citalopram (CELEXA) 20 MG Tab TAKE 1 & 1/2 TABLETS BY MOUTH EVERY  Tab 2   • albuterol 108 (90 BASE) MCG/ACT Aero Soln inhalation aerosol Inhale 2 Puffs by mouth every 6 hours as needed for Shortness of Breath. 8.5 g 0   • fluticasone (FLONASE) 50 MCG/ACT nasal spray Spray 1 Spray in nose every day. 16 g 3   • Cholecalciferol (VITAMIN D PO) Take 2,000 mg by mouth every day.     • Calcium 1500 MG Tab Take 1 Tab by mouth every day.     • Glucosamine HCl (GLUCOSAMINE PO) Take 1 Tab by mouth every day.     • Omega-3 Fatty Acids (FISH OIL) 1000 MG Cap capsule Take 1,000 mg by mouth every day.     • Multiple Vitamins-Minerals (MULTIVITAMIN PO) Take 1 Tab by mouth every day.     • fexofenadine (ALLEGRA) 180 MG tablet Take 1 Tab by mouth  "every day. 90 Tab 3     No current facility-administered medications for this visit.        Social History   Substance Use Topics   • Smoking status: Never Smoker   • Smokeless tobacco: Never Used   • Alcohol use Yes      Comment: Rare       Social History     Social History Narrative   • No narrative on file       Family History   Problem Relation Age of Onset   • Lung Disease Mother      COPD   • Diabetes Mother    • Hypertension Mother    • Heart Disease Father    • Hypertension Father    • Cancer Sister      Breast   • Cancer Brother      Colon, prostate       ROS:       Exam:    Blood pressure 130/72, pulse 72, temperature 37.6 °C (99.7 °F), resp. rate 12, height 1.499 m (4' 11\"), weight 54 kg (119 lb), SpO2 96 %.    General:  Well nourished, well developed female in NAD    Physical Exam   Constitutional:  Appears well-developed and well-nourished. Is active and cooperative.  Non-toxic appearance.   Head: Normocephalic and atraumatic.   Right Ear: External ear normal. Left Ear: External ear normal.   Eyes: Conjunctivae, EOM and lids are normal. No scleral icterus. .   Neurological: No display of seizure activity. Coordination and gait normal.   Skin: Skin is warm and dry. Patient is not diaphoretic.   Psychiatric: patient has a normal mood and affect; speech is normal and behavior is normal.  Left shoulder. She has decreased ROM of the left shoulder with flexion. She also has pain when she flexes her shoulder. No point tenderness to palpation   Right hand. She has some prominence of the head of the 4th metacarpal on the palmar surface. Mild TTP to palpation       Assessment/Plan:    1. Right hand pain  Uncontrolled. Discussed with patient. Will check xrays then may need referral to hand surgery  - DX-HAND 2- RIGHT; Future    2. Acute pain of left shoulder  Uncontrolled. Per patient she has had an injury to the collar bone in the past. Will recheck xray then go from there   - DX-SHOULDER 2+ LEFT; Future  - " tramadol (ULTRAM) 50 MG Tab; Take 1 Tab by mouth every 8 hours as needed for up to 20 days.  Dispense: 30 Tab; Refill: 0  - CONSENT FOR OPIATE PRESCRIPTION    She is to follow up in one month   We did discuss her depression score and she thinks that this is due to pain. Will readdress at follow up       Please note that this dictation was created using voice recognition software. I have made every reasonable attempt to correct obvious errors, but I expect that there are errors of grammar and possibly content that I did not discover before finalizing the note.

## 2018-02-07 ENCOUNTER — HOSPITAL ENCOUNTER (OUTPATIENT)
Dept: RADIOLOGY | Facility: MEDICAL CENTER | Age: 58
End: 2018-02-07
Attending: FAMILY MEDICINE
Payer: COMMERCIAL

## 2018-02-07 DIAGNOSIS — M79.641 RIGHT HAND PAIN: ICD-10-CM

## 2018-02-07 DIAGNOSIS — G89.29 CHRONIC NECK PAIN: ICD-10-CM

## 2018-02-07 DIAGNOSIS — M25.512 ACUTE PAIN OF LEFT SHOULDER: ICD-10-CM

## 2018-02-07 DIAGNOSIS — M54.2 CHRONIC NECK PAIN: ICD-10-CM

## 2018-02-07 PROCEDURE — 73130 X-RAY EXAM OF HAND: CPT | Mod: RT

## 2018-02-07 PROCEDURE — 73030 X-RAY EXAM OF SHOULDER: CPT | Mod: LT

## 2018-02-07 NOTE — TELEPHONE ENCOUNTER
Was the patient seen in the last year in this department? Yes    last fill 12/28/17 #30  Does patient have an active prescription for medications requested? No     Received Request Via: Patient

## 2018-02-08 DIAGNOSIS — Z78.0 MENOPAUSE: Primary | ICD-10-CM

## 2018-02-08 RX ORDER — CARISOPRODOL 350 MG/1
TABLET ORAL
Qty: 30 TAB | Refills: 0 | Status: SHIPPED
Start: 2018-02-08 | End: 2018-03-06 | Stop reason: SDUPTHER

## 2018-02-10 DIAGNOSIS — S20.212A RIB CONTUSION, LEFT, INITIAL ENCOUNTER: ICD-10-CM

## 2018-02-10 PROBLEM — M19.012 PRIMARY OSTEOARTHRITIS OF LEFT SHOULDER: Status: ACTIVE | Noted: 2018-02-10

## 2018-02-13 RX ORDER — CITALOPRAM 20 MG/1
TABLET ORAL
Qty: 135 TAB | Refills: 3 | Status: SHIPPED | OUTPATIENT
Start: 2018-02-13 | End: 2018-03-22

## 2018-03-01 DIAGNOSIS — R05.8 PRODUCTIVE COUGH: ICD-10-CM

## 2018-03-01 RX ORDER — CODEINE PHOSPHATE AND GUAIFENESIN 10; 100 MG/5ML; MG/5ML
5 SOLUTION ORAL EVERY 6 HOURS PRN
Qty: 140 ML | Refills: 0 | Status: SHIPPED
Start: 2018-03-01 | End: 2018-03-06 | Stop reason: SDUPTHER

## 2018-03-01 NOTE — TELEPHONE ENCOUNTER
Prescription faxed to:    [id number] - [place], [place] - [address] [place] [address]  Phone: [phone] Fax: [phone]  .

## 2018-03-01 NOTE — TELEPHONE ENCOUNTER
Was the patient seen in the last year in this department? Yes     Does patient have an active prescription for medications requested? No     Received Request Via: Pharmacy     Per  last fill: 7-20-16 # 120

## 2018-03-06 ENCOUNTER — OFFICE VISIT (OUTPATIENT)
Dept: MEDICAL GROUP | Facility: LAB | Age: 58
End: 2018-03-06
Payer: COMMERCIAL

## 2018-03-06 VITALS
SYSTOLIC BLOOD PRESSURE: 110 MMHG | BODY MASS INDEX: 24.19 KG/M2 | DIASTOLIC BLOOD PRESSURE: 78 MMHG | TEMPERATURE: 98.8 F | RESPIRATION RATE: 12 BRPM | HEART RATE: 80 BPM | WEIGHT: 120 LBS | HEIGHT: 59 IN | OXYGEN SATURATION: 95 %

## 2018-03-06 DIAGNOSIS — M54.2 CHRONIC NECK PAIN: ICD-10-CM

## 2018-03-06 DIAGNOSIS — J06.9 ACUTE URI: ICD-10-CM

## 2018-03-06 DIAGNOSIS — G89.29 CHRONIC NECK PAIN: ICD-10-CM

## 2018-03-06 DIAGNOSIS — R05.8 PRODUCTIVE COUGH: ICD-10-CM

## 2018-03-06 DIAGNOSIS — M25.512 ACUTE PAIN OF LEFT SHOULDER: ICD-10-CM

## 2018-03-06 PROCEDURE — 99214 OFFICE O/P EST MOD 30 MIN: CPT | Performed by: NURSE PRACTITIONER

## 2018-03-06 RX ORDER — CODEINE PHOSPHATE AND GUAIFENESIN 10; 100 MG/5ML; MG/5ML
5 SOLUTION ORAL EVERY 6 HOURS PRN
Qty: 140 ML | Refills: 0 | Status: SHIPPED | OUTPATIENT
Start: 2018-03-06 | End: 2018-03-13

## 2018-03-06 RX ORDER — CEFDINIR 300 MG/1
300 CAPSULE ORAL 2 TIMES DAILY
Qty: 14 CAP | Refills: 0 | Status: SHIPPED | OUTPATIENT
Start: 2018-03-06 | End: 2018-03-13

## 2018-03-06 RX ORDER — CARISOPRODOL 350 MG/1
TABLET ORAL
Qty: 30 TAB | Refills: 0 | Status: SHIPPED | OUTPATIENT
Start: 2018-03-06 | End: 2018-04-10 | Stop reason: SDUPTHER

## 2018-03-06 RX ORDER — TRAMADOL HYDROCHLORIDE 50 MG/1
50 TABLET ORAL EVERY 8 HOURS PRN
Qty: 30 TAB | Refills: 0 | Status: SHIPPED | OUTPATIENT
Start: 2018-03-06 | End: 2018-04-05

## 2018-03-06 NOTE — PROGRESS NOTES
"Chief Complaint   Patient presents with   • Cough     pt states she has cough, sore throat, fever, body aches, onset 2 days      HPI:  Jojo is a 56 yo est female here with new onset URI symptoms.  She has had 3 d of cough, body aches, congestion, ST, HA and not feeling well.  She does that her symptoms are worsening. Unfortunately she did not get a flu shot.  Her  has similar symptoms and he has been sick for a week and a half.  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  Taking cheratussin, ibuprofen and tramadol for symptoms.  Nonsmoker.  Given an inhaler 12/2017 and this is not helping - not feeling SOB.      She suffers from chronic neck and shoulder pain. She takes tramadol and Soma at night as needed for severe pain if she has trouble falling asleep, both of these worked very well for her. Denies any negative side effects of tramadol or Soma with the exception of sleepiness. She denies constipation.    Past medical, surgical, family, and social history is reviewed and updated in Epic chart by me today.   Medications and allergies reviewed and updated in Epic chart by me today.     ROS:   As documented in history of present illness above    Exam:  Blood pressure 110/78, pulse 80, temperature 37.1 °C (98.8 °F), resp. rate 12, height 1.499 m (4' 11\"), weight 54.4 kg (120 lb), SpO2 95 %.  Constitutional: Alert, no distress, plus 3 vital signs  Skin:  Warm, dry, no rashes invisible areas  Eye: Equal, round and reactive, conjunctiva clear  ENMT: Bilateral tympanic membranes are retracted but translucent without erythema or perforation. No sinus tenderness. Oropharynx is slightly injected without exudate. No tonsillar enlargement.    Neck: Mild anterior cervical, nontender lymphadenopathy  Respiratory: Unlabored respiration, lungs clear to auscultation, no wheezes, no rhonchi  Cardiovascular: Normal rate and rhythm  Psych: Alert, pleasant, well-groomed, normal " affect    A/P:  1. Acute URI  -Suspect that her sore throat is related to postnasal dripping and a mild sinus infection. Symptoms are worsening. 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 I discussed starting antibiotics after 5-7 days if symptoms are worsening. Discussed potential for diarrhea associated with Cefdinir  - cefdinir (OMNICEF) 300 MG Cap; Take 1 Cap by mouth 2 times a day for 7 days.  Dispense: 14 Cap; Refill: 0    2. Productive cough  -She requested a refill of Cheratussin which has been helping her at night  - guaifenesin-codeine (CHERATUSSIN AC) Solution oral solution; Take 5 mL by mouth every 6 hours as needed for Cough for up to 7 days.  Dispense: 140 mL; Refill: 0    3. Chronic neck pain  Last refill of tramadol was February 6 and Soma was February 13, she was given refills of both today and I encouraged her to make a follow-up appointment with Dr. Odell to discuss further.   Carisoprodol (SOMA) 350 MG Tab; TAKE 1 TABLET BY MOUTH NIGHTLY AT BEDTIME AS NEEDED FOR MUSCLE SPASMS  Dispense: 30 Tab; Refill: 0  - tramadol (ULTRAM) 50 MG Tab; Take 1 Tab by mouth every 8 hours as needed for up to 30 days.  Dispense: 30 Tab; Refill: 0    4. Pain of left shoulder  - carisoprodol (SOMA) 350 MG Tab; TAKE 1 TABLET BY MOUTH NIGHTLY AT BEDTIME AS NEEDED FOR MUSCLE SPASMS  Dispense: 30 Tab; Refill: 0  - tramadol (ULTRAM) 50 MG Tab; Take 1 Tab by mouth every 8 hours as needed for up to 30 days.  Dispense: 30 Tab; Refill: 0    She is obtaining relief from her narcotic therapy, improving her quality of life and would benefit from continuation of narcotics. Controlled substance agreement was signed with Dr. Odell in February.

## 2018-03-09 ENCOUNTER — TELEPHONE (OUTPATIENT)
Dept: MEDICAL GROUP | Facility: LAB | Age: 58
End: 2018-03-09

## 2018-03-09 NOTE — TELEPHONE ENCOUNTER
1. Caller Name: Jojo                                      Call Back Number: [phone]      Patient approves a detailed voicemail message: yes    Pt calling requesting a work note for 3/9  Friday through 3/11 [place]. Seen on 3/6 and still not feeling better. Please fax to [phone]

## 2018-03-14 DIAGNOSIS — J02.9 ACUTE PHARYNGITIS, UNSPECIFIED ETIOLOGY: ICD-10-CM

## 2018-03-14 RX ORDER — CLARITHROMYCIN 500 MG/1
500 TABLET, COATED ORAL 2 TIMES DAILY
Qty: 14 TAB | Refills: 0 | Status: SHIPPED | OUTPATIENT
Start: 2018-03-14 | End: 2018-03-21

## 2018-03-22 ENCOUNTER — OFFICE VISIT (OUTPATIENT)
Dept: MEDICAL GROUP | Facility: LAB | Age: 58
End: 2018-03-22
Payer: COMMERCIAL

## 2018-03-22 VITALS
BODY MASS INDEX: 23.79 KG/M2 | HEIGHT: 59 IN | RESPIRATION RATE: 12 BRPM | TEMPERATURE: 98.3 F | DIASTOLIC BLOOD PRESSURE: 68 MMHG | HEART RATE: 84 BPM | OXYGEN SATURATION: 95 % | WEIGHT: 118 LBS | SYSTOLIC BLOOD PRESSURE: 100 MMHG

## 2018-03-22 DIAGNOSIS — E55.9 VITAMIN D INSUFFICIENCY: ICD-10-CM

## 2018-03-22 DIAGNOSIS — M19.012 PRIMARY OSTEOARTHRITIS OF LEFT SHOULDER: ICD-10-CM

## 2018-03-22 DIAGNOSIS — D35.2 PROLACTINOMA (HCC): ICD-10-CM

## 2018-03-22 DIAGNOSIS — R73.03 PREDIABETES: ICD-10-CM

## 2018-03-22 DIAGNOSIS — R73.01 ELEVATED FASTING GLUCOSE: ICD-10-CM

## 2018-03-22 DIAGNOSIS — E78.5 HYPERLIPIDEMIA, UNSPECIFIED HYPERLIPIDEMIA TYPE: ICD-10-CM

## 2018-03-22 DIAGNOSIS — I10 ESSENTIAL HYPERTENSION: ICD-10-CM

## 2018-03-22 DIAGNOSIS — F32.A DEPRESSION, UNSPECIFIED DEPRESSION TYPE: ICD-10-CM

## 2018-03-22 DIAGNOSIS — R80.9 MICROALBUMINURIA: ICD-10-CM

## 2018-03-22 PROCEDURE — 99214 OFFICE O/P EST MOD 30 MIN: CPT | Performed by: FAMILY MEDICINE

## 2018-03-22 RX ORDER — ESCITALOPRAM OXALATE 10 MG/1
10 TABLET ORAL DAILY
Qty: 30 TAB | Refills: 3 | Status: SHIPPED | OUTPATIENT
Start: 2018-03-22 | End: 2018-08-28

## 2018-03-22 NOTE — LETTER
[facility]  Sarita Odell M.D.  [address]  [place] [address]  Fax: [phone]   Authorization for Release/Disclosure of   Protected Health Information   Name: MARILOU ARIAS REYES : 1960 SSN: [id number]   Address: [address] [place] [address] [place] [address] Phone:    [phone] (home) [phone] (work)   I authorize the entity listed below to release/disclose the PHI below to:   [facility]/Sarita Odell M.D. and Sarita Odell M.D.   Provider or Entity Name:  Nafisa Bland M.D.   Address   [facility], [facility], [facility]   Phone:      Fax:  Fax: [phone]   Reason for request: continuity of care   Information to be released:    [  ] LAST COLONOSCOPY,  including any PATH REPORT and follow-up  [  ] LAST FIT/COLOGUARD RESULT [XX  ] LAST DEXA  [  ] LAST MAMMOGRAM  [  ] LAST PAP  [  ] LAST LABS [  ] RETINA EXAM REPORT  [  ] IMMUNIZATION RECORDS  [  ] Release all info      [  ] Check here and initial the line next to each item to release ALL health information INCLUDING  _____ Care and treatment for drug and / or alcohol abuse  _____ HIV testing, infection status, or AIDS  _____ Genetic Testing    DATES OF SERVICE OR TIME PERIOD TO BE DISCLOSED: _____________  I understand and acknowledge that:  * This Authorization may be revoked at any time by you in writing, except if your health information has already been used or disclosed.  * Your health information that will be used or disclosed as a result of you signing this authorization could be re-disclosed by the recipient. If this occurs, your re-disclosed health information may no longer be protected by State or Federal laws.  * You may refuse to sign this Authorization. Your refusal will not affect your ability to obtain treatment.  * This Authorization becomes effective upon signing and will  on (date) __________.      If no date is indicated, this Authorization will  one (1) year from the signature date.    Name: Gaudencio Call  Reyes    Signature:   Date:     3/22/2018       PLEASE FAX REQUESTED RECORDS BACK TO: [phone]

## 2018-03-22 NOTE — PROGRESS NOTES
Chief Complaint   Patient presents with   • Arthritis     follow up imaging       HISTORY OF PRESENT ILLNESS: Patient is a 57 y.o. female established patient who presents today to follow up     Right hand pain  This is a chronic problem. Patient had x-ray of the right hand. She has mild fourth and fifth DIP osteoarthritis. She has mild radiocarpal joint space narrowing and also has osteopenia. She did have a DEXA scan ordered and this was done with her gynecologist and we have requested these records.     Acute pain of left shoulder  This is a chronic problem. At last visit patient also had an x-ray of her left shoulder.  This shows that she has an unchanged shoulder series from prior x-ray however has moderate lateral humeral mild acromioclavicular joint osteoarthritis. There also was noted to be a healed distal third clavicular fracture. Patient does take tramadol for this pain. She does not take this at work usually and sometimes takes it 1-2 times a day. Soma helps with the pain and discomfort too. She does not take this at work either     Interpretation of PHQ-9 Total Score   Score Severity   1-4 No Depression   5-9 Mild Depression   10-14 Moderate Depression   15-19 Moderately Severe Depression   20-27 Severe Depression    Depression   This is a chronic problem. She is taking celexa 30 mg daily and does not think that this is helping.  At last visit her depression score was 8. She thinks at the time that the the depression was due to pain however now she thinks she needs to change her medication     Was sick and finished biaxin yesterday     Patient Active Problem List    Diagnosis Date Noted   • Primary osteoarthritis of left shoulder 02/10/2018   • Elevated fasting glucose 05/26/2017   • Essential hypertension 05/26/2017   • Acute pain of left shoulder 05/26/2017   • Elevated ALT measurement 05/26/2017   • Nasal congestion 05/17/2016   • Dry cough 05/17/2016   • Prediabetes 04/01/2016   • Microalbuminuria  04/01/2016   • Environmental allergies 01/28/2016   • Hypertension 01/28/2016   • Hyperlipidemia 01/28/2016   • Left cervical radiculopathy 01/28/2016   • Chronic neck pain 01/28/2016   • Vitamin D insufficiency 01/28/2016   • Allergic rhinitis due to allergen 01/28/2016   • Colon polyps 01/28/2016   • Depression 11/01/2013   • Back pain 11/01/2013   • Prolactinoma (CMS-[facility])         Allergies:Patient has no known allergies.    Current Outpatient Prescriptions   Medication Sig Dispense Refill   • carisoprodol (SOMA) 350 MG Tab TAKE 1 TABLET BY MOUTH NIGHTLY AT BEDTIME AS NEEDED FOR MUSCLE SPASMS 30 Tab 0   • tramadol (ULTRAM) 50 MG Tab Take 1 Tab by mouth every 8 hours as needed for up to 30 days. 30 Tab 0   • citalopram (CELEXA) 20 MG Tab Take 1 & 1/2 tablets by mouth every day. 135 Tab 3   • Diclofenac Sodium 1 % Gel APPLY 1 GRAM EXTERNALLY TO SKIN  OF AFFECTED AREA(S) FOUR TIMES DAILY 100 g 0   • losartan (COZAAR) 50 MG Tab TAKE ONE TABLET BY MOUTH ONE TIME DAILY 90 Tab 2   • albuterol 108 (90 Base) MCG/ACT Aero Soln inhalation aerosol Inhale 2 Puffs by mouth every 6 hours as needed for Shortness of Breath. 8.5 g 1   • amoxicillin-clavulanate (AUGMENTIN) 875-125 MG Tab Take 1 Tab by mouth 2 times a day. 20 Tab 0   • ibuprofen (MOTRIN) 800 MG Tab Take 1 Tab by mouth every 8 hours as needed for Mild Pain. 30 Tab 3   • phentermine 15 MG capsule Take 1 Cap by mouth every morning. 30 Cap 1   • meloxicam (MOBIC) 15 MG tablet Take 1 Tab by mouth every day. 90 Tab 1   • omeprazole (PRILOSEC) 20 MG delayed-release capsule TAKE ONE CAPSULE BY MOUTH ONE TIME DAILY 30 Cap 11   • albuterol 108 (90 BASE) MCG/ACT Aero Soln inhalation aerosol Inhale 2 Puffs by mouth every 6 hours as needed for Shortness of Breath. 8.5 g 0   • fluticasone (FLONASE) 50 MCG/ACT nasal spray Spray 1 Spray in nose every day. 16 g 3   • Cholecalciferol (VITAMIN D PO) Take 2,000 mg by mouth every day.     • Calcium 1500 MG Tab Take 1 Tab by mouth every  "day.     • Glucosamine HCl (GLUCOSAMINE PO) Take 1 Tab by mouth every day.     • Omega-3 Fatty Acids (FISH OIL) 1000 MG Cap capsule Take 1,000 mg by mouth every day.     • Multiple Vitamins-Minerals (MULTIVITAMIN PO) Take 1 Tab by mouth every day.     • fexofenadine (ALLEGRA) 180 MG tablet Take 1 Tab by mouth every day. 90 Tab 3     No current facility-administered medications for this visit.        Social History   Substance Use Topics   • Smoking status: Never Smoker   • Smokeless tobacco: Never Used   • Alcohol use Yes      Comment: Rare       Social History     Social History Narrative   • No narrative on file       Family History   Problem Relation Age of Onset   • Lung Disease Mother      COPD   • Diabetes Mother    • Hypertension Mother    • Heart Disease Father    • Hypertension Father    • Cancer Sister      Breast   • Cancer Brother      Colon, prostate       ROS:         Exam:    Blood pressure 100/68, pulse 84, temperature 36.8 °C (98.3 °F), resp. rate 12, height 1.499 m (4' 11\"), weight 53.5 kg (118 lb), SpO2 95 %.    General:  Well nourished, well developed female in NAD    Physical Exam   Constitutional: Appears well-developed and well-nourished. Is active and cooperative.  Non-toxic appearance.  no tachypnea, no respiratory distress. Speaking full sentences   Head: Normocephalic and atraumatic.   Right Ear: External ear normal. Left Ear: External ear normal.   Eyes: Conjunctivae, EOM and lids are normal. No scleral icterus.   Cardiovascular: Normal rate, regular rhythm and normal heart sounds.    Pulmonary/Chest: Effort normal and breath sounds normal.    Neurological: Alert.  No display of seizure activity. Coordination and gait normal.   Skin: Skin is warm and dry. Patient is not diaphoretic.   Psychiatric: patient has a normal mood and affect; speech is normal and behavior is normal.      2/7/2018 8:32 AM    HISTORY/REASON FOR EXAM:  Second ray pain for 3 months    TECHNIQUE/EXAM DESCRIPTION AND " NUMBER OF VIEWS:  3 views of the RIGHT hand.    COMPARISON: None    FINDINGS:  No acute fracture or subluxation is seen.    There is likely osteopenia    There is fourth and fifth DIP dorsal marginal spurring    Mild radiocarpal joint space narrowing    No secondary ray abnormality is seen.    No erosion is visualized   Impression       Normal radiographic appearance of the second ray    Mild fourth and fifth DIP osteoarthritis    Mild radiocarpal joint space narrowing    Osteopenia         2/7/2018 8:32 AM    HISTORY/REASON FOR EXAM: Injury 1 year ago, new pain over the last month, tender.    TECHNIQUE/EXAM DESCRIPTION AND NUMBER OF VIEWS:  3 views of the LEFT shoulder.    COMPARISON: June 1    FINDINGS:  There is no evidence of acute displaced fracture or dislocation.    There is moderate glenohumeral spurring as before    No periarticular calcification    No acromioclavicular joint widening. There is minimal spurring. Healed clavicle shaft fracture    No acute abnormality of the visualized hemithorax is noted.   Impression       Unchanged shoulder series.    Moderate glenohumeral, mild acromial clavicular joint osteoarthritis    Healed distal thirds clavicle fracture         Assessment/Plan:     1. Primary osteoarthritis of left shoulder  Discussed with patient. Referral to sports medicine  - REFERRAL TO SPORTS MEDICINE    2. Depression, unspecified depression type  Uncontrolled. Stop Celexa. Start Lexapro 10 mg one by mouth daily.  - escitalopram (LEXAPRO) 10 MG Tab; Take 1 Tab by mouth every day.  Dispense: 30 Tab; Refill: 3    3. Prolactinoma (CMS-[facility])  Check prolactin level  - PROLACTIN; Future    4. Essential hypertension  Check labs. Blood pressure is controlled  - TSH; Future  - CBC WITH DIFFERENTIAL; Future  - MICROALBUMIN CREAT RATIO URINE; Future  - COMP METABOLIC PANEL; Future    5. Hyperlipidemia, unspecified hyperlipidemia type  Check labs  - LIPID PROFILE; Future  - COMP METABOLIC PANEL;  Future    6. Vitamin D insufficiency  Check vitamin D  - VITAMIN D,25 HYDROXY; Future    7. Prediabetes  Check A1c and fasting glucose  - COMP METABOLIC PANEL; Future  - HEMOGLOBIN A1C; Future    8. Microalbuminuria  Check urine microalbumin creatinine ratio  - MICROALBUMIN CREAT RATIO URINE; Future    9. Elevated fasting glucose  Check labs  - COMP METABOLIC PANEL; Future  - HEMOGLOBIN A1C; Future    Patient is to follow-up after her labs.     Please note that this dictation was created using voice recognition software. I have made every reasonable attempt to correct obvious errors, but I expect that there are errors of grammar and possibly content that I did not discover before finalizing the note.

## 2018-03-24 DIAGNOSIS — R05.9 COUGH: ICD-10-CM

## 2018-03-26 ENCOUNTER — OFFICE VISIT (OUTPATIENT)
Dept: MEDICAL GROUP | Facility: CLINIC | Age: 58
End: 2018-03-26
Payer: COMMERCIAL

## 2018-03-26 VITALS
HEIGHT: 59 IN | DIASTOLIC BLOOD PRESSURE: 74 MMHG | WEIGHT: 118 LBS | TEMPERATURE: 98.1 F | SYSTOLIC BLOOD PRESSURE: 124 MMHG | OXYGEN SATURATION: 94 % | RESPIRATION RATE: 18 BRPM | HEART RATE: 103 BPM | BODY MASS INDEX: 23.79 KG/M2

## 2018-03-26 DIAGNOSIS — M19.012 OSTEOARTHRITIS OF GLENOHUMERAL JOINT, LEFT: ICD-10-CM

## 2018-03-26 DIAGNOSIS — M19.049 HAND ARTHRITIS: ICD-10-CM

## 2018-03-26 PROCEDURE — 99203 OFFICE O/P NEW LOW 30 MIN: CPT | Performed by: FAMILY MEDICINE

## 2018-03-26 RX ORDER — CODEINE PHOSPHATE AND GUAIFENESIN 10; 100 MG/5ML; MG/5ML
5 SOLUTION ORAL EVERY 6 HOURS PRN
Qty: 140 ML | Refills: 0 | Status: SHIPPED
Start: 2018-03-26 | End: 2018-04-02

## 2018-03-26 RX ORDER — METHYLPREDNISOLONE 4 MG/1
TABLET ORAL
Qty: 1 KIT | Refills: 0 | Status: SHIPPED | OUTPATIENT
Start: 2018-03-26 | End: 2019-06-20

## 2018-03-26 NOTE — TELEPHONE ENCOUNTER
Was the patient seen in the last year in this department? Yes     Does patient have an active prescription for medications requested? No     Received Request Via: Pharmacy     Last visit:3/22/18    Last  fill:3/15/18

## 2018-03-26 NOTE — PROGRESS NOTES
CHIEF COMPLAINT:  Marilou Arias Reyes female presenting at the request of Sarita Odell M.D. for evaluation of Shoulderpain.     Marilou Arias Reyes is complaining of left shoulder pain  On and off for 10 years, episodes since back then  Worse over the past 3 months  Pain is at the superior  Quality is aching, sharp  Pain is Radiating down the LEFT arm to the elbow  Aggravated by overhead activity and closing blinds , works as a pharmacist  Improved with  rest   RECENT fall and shoulder injury with clavicular fracture 2/2017  Prior Treatments: did 3 sessions of PT back in 2007, did NOT help  Prior studies: X-Ray   Medications tried for pain include: ibuprofen (OTC) 800 mg helps some  Mechanical Symptom history: No Locking, Popping and painless    REVIEW OF SYSTEMS  No Nausea, No Vomiting, No Chest Pain, No Shortness of Breath, No Dizziness, Headache    PAST MEDICAL HISTORY:   History reviewed. No pertinent past medical history.    PMH:  has a past medical history of Gestational diabetes and Prolactinoma (CMS-HCC).  MEDS:   Current Outpatient Prescriptions:   •  guaifenesin-codeine (CHERATUSSIN AC) Solution oral solution, Take 5 mL by mouth every 6 hours as needed for Cough for up to 7 days., Disp: 140 mL, Rfl: 0  •  escitalopram (LEXAPRO) 10 MG Tab, Take 1 Tab by mouth every day., Disp: 30 Tab, Rfl: 3  •  carisoprodol (SOMA) 350 MG Tab, TAKE 1 TABLET BY MOUTH NIGHTLY AT BEDTIME AS NEEDED FOR MUSCLE SPASMS, Disp: 30 Tab, Rfl: 0  •  tramadol (ULTRAM) 50 MG Tab, Take 1 Tab by mouth every 8 hours as needed for up to 30 days., Disp: 30 Tab, Rfl: 0  •  Diclofenac Sodium 1 % Gel, APPLY 1 GRAM EXTERNALLY TO SKIN  OF AFFECTED AREA(S) FOUR TIMES DAILY, Disp: 100 g, Rfl: 0  •  losartan (COZAAR) 50 MG Tab, TAKE ONE TABLET BY MOUTH ONE TIME DAILY, Disp: 90 Tab, Rfl: 2  •  albuterol 108 (90 Base) MCG/ACT Aero Soln inhalation aerosol, Inhale 2 Puffs by mouth every 6 hours as needed for Shortness of Breath., Disp: 8.5 g, Rfl:  "1  •  amoxicillin-clavulanate (AUGMENTIN) 875-125 MG Tab, Take 1 Tab by mouth 2 times a day., Disp: 20 Tab, Rfl: 0  •  ibuprofen (MOTRIN) 800 MG Tab, Take 1 Tab by mouth every 8 hours as needed for Mild Pain., Disp: 30 Tab, Rfl: 3  •  phentermine 15 MG capsule, Take 1 Cap by mouth every morning., Disp: 30 Cap, Rfl: 1  •  meloxicam (MOBIC) 15 MG tablet, Take 1 Tab by mouth every day., Disp: 90 Tab, Rfl: 1  •  omeprazole (PRILOSEC) 20 MG delayed-release capsule, TAKE ONE CAPSULE BY MOUTH ONE TIME DAILY, Disp: 30 Cap, Rfl: 11  •  albuterol 108 (90 BASE) MCG/ACT Aero Soln inhalation aerosol, Inhale 2 Puffs by mouth every 6 hours as needed for Shortness of Breath., Disp: 8.5 g, Rfl: 0  •  fluticasone (FLONASE) 50 MCG/ACT nasal spray, Spray 1 Spray in nose every day., Disp: 16 g, Rfl: 3  •  Cholecalciferol (VITAMIN D PO), Take 2,000 mg by mouth every day., Disp: , Rfl:   •  Calcium 1500 MG Tab, Take 1 Tab by mouth every day., Disp: , Rfl:   •  Glucosamine HCl (GLUCOSAMINE PO), Take 1 Tab by mouth every day., Disp: , Rfl:   •  Omega-3 Fatty Acids (FISH OIL) 1000 MG Cap capsule, Take 1,000 mg by mouth every day., Disp: , Rfl:   •  Multiple Vitamins-Minerals (MULTIVITAMIN PO), Take 1 Tab by mouth every day., Disp: , Rfl:   •  fexofenadine (ALLEGRA) 180 MG tablet, Take 1 Tab by mouth every day., Disp: 90 Tab, Rfl: 3  ALLERGIES: No Known Allergies  SURGHX:   Past Surgical History:   Procedure Laterality Date   • CHOLECYSTECTOMY     • PRIMARY C SECTION       SOCHX:  reports that she has never smoked. She has never used smokeless tobacco. She reports that she drinks alcohol. She reports that she does not use drugs.  FH: Family history was reviewed, no pertinent findings to report     PHYSICAL EXAM:  /74   Pulse (!) 103   Temp 36.7 °C (98.1 °F)   Resp 18   Ht 1.499 m (4' 11\")   Wt 53.5 kg (118 lb)   SpO2 94%   BMI 23.83 kg/m²       well-developed, well-nourished in no apparent distress, alert and oriented x 3.  Gait: " normal    Cervical spine:  Range of motion Intact  Spurling's testing is POSITIVE with pain radiating into the shoulder and christine-scapular region  Cervical spine tenderness NEGATIVE    Strength testing:     Deltoid, bilateral 5/5  Bicep, bilateral 5/5  Tricep, bilateral 5/5  Wrist Extension, bilateral 5/5  Wrist Flexion, bilateral 5/5  Finger Abduction, bilateral 5/5    Sensation:  DECREASED on the left at the level of C6        Reflexes:   Biceps: R 2+/L 2+  Triceps: R 2+/L  2+  Brachial radialis R 2+/L  2+  Choudhury's testing is NEGATIVE  The arms are otherwise neurovascularly intact     Shoulder Exam:    RIGHT Shoulder:  No visible swelling   Range of motion INTACT  Tenderness: Non-tender  Empty Can Testing 5/5  Internal Rotation 5/5  External Rotation 5/5  Lift Off Testing 5/5  Impingement testing Mccauley  NEGATIVE  Neer's testing NEGATIVE  Apprehension testing NEGATIVE  Relocation testing NEGATIVE  Smith's Testing NEGATIVE  Grind Testing NEGATIVE      LEFT Shoulder:  No visible swelling   Range of motion INTACT  Tenderness: AC Joint Tenderness  Empty Can Testing 5/5  Internal Rotation 5/5  External Rotation 5/5  Lift Off Testing 5/5  Impingement testing Mccauley  POSITIVE  Neer's testing POSITIVE  Apprehension testing POSITIVE  Relocation testing NEGATIVE  Smith's Testing NEGATIVE  Grind Testing NEGATIVE      Additional Findings: Flexed Posture      1. Osteoarthritis of glenohumeral joint, left  REFERRAL FOR ACUPUNCTURE    MethylPREDNISolone (MEDROL DOSEPAK) 4 MG Tablet Therapy Pack   2. Hand arthritis        Discussed knee osteoarthritis management options includin.  SAMe and anti-inflammatories   2.  Non-offending activities such as swimming   3.  Acupuncture for which she has been referred   4. Injection options including corticosteroid which she has DECLINED at this time requesting medrol dosepack instead  5. Surgical options    Return if symptoms worsen or fail to improve. and consider intra-articular  LEFT GH injection if symptoms persist          Results for orders placed during the hospital encounter of 04/25/08   DX-CERVICAL SPINE-2 OR 3 VIEWS    Impression IMPRESSION:     1. MINIMAL MID-CERVICAL SPINE DEGENERATIVE DISK DISEASE AND MINIMAL   ANTERIOR WEDGING OF THE C5 VERTEBRAL BODY.    2. NO ACUTE CERVICAL SPINE ABNORMALITIES.                Results for orders placed during the hospital encounter of 08/04/16   DX-CHEST-2 VIEWS    Impression No active disease.                        Results for orders placed during the hospital encounter of 02/07/18   DX-HAND 3+ RIGHT    Impression Normal radiographic appearance of the second ray    Mild fourth and fifth DIP osteoarthritis    Mild radiocarpal joint space narrowing    Osteopenia                                           Results for orders placed during the hospital encounter of 02/07/18   DX-SHOULDER 2+ LEFT    Impression Unchanged shoulder series.    Moderate glenohumeral, mild acromial clavicular joint osteoarthritis    Healed distal thirds clavicle fracture                  done elsewhere and reviewed independently by me    Thank you Sarita Odell M.D. for allowing me to participate in caring for your patient.

## 2018-04-09 RX ORDER — CARISOPRODOL 350 MG/1
TABLET ORAL
Qty: 30 TAB | Refills: 0
Start: 2018-04-09 | End: 2018-05-09

## 2018-04-09 RX ORDER — TRAMADOL HYDROCHLORIDE 50 MG/1
TABLET ORAL
Qty: 30 TAB | Refills: 0
Start: 2018-04-09 | End: 2018-05-09

## 2018-04-10 DIAGNOSIS — M25.512 ACUTE PAIN OF LEFT SHOULDER: ICD-10-CM

## 2018-04-10 DIAGNOSIS — G89.29 CHRONIC NECK PAIN: ICD-10-CM

## 2018-04-10 DIAGNOSIS — M54.2 CHRONIC NECK PAIN: ICD-10-CM

## 2018-04-10 RX ORDER — CARISOPRODOL 350 MG/1
TABLET ORAL
Qty: 30 TAB | Refills: 0 | Status: SHIPPED | OUTPATIENT
Start: 2018-04-10 | End: 2018-05-08

## 2018-05-07 RX ORDER — CYCLOBENZAPRINE HCL 10 MG
TABLET ORAL
Qty: 30 TAB | Refills: 0 | Status: SHIPPED | OUTPATIENT
Start: 2018-05-07 | End: 2018-07-10 | Stop reason: SDUPTHER

## 2018-07-10 RX ORDER — CYCLOBENZAPRINE HCL 10 MG
TABLET ORAL
Qty: 30 TAB | Refills: 0 | Status: SHIPPED | OUTPATIENT
Start: 2018-07-10 | End: 2018-09-28 | Stop reason: SDUPTHER

## 2018-07-10 NOTE — TELEPHONE ENCOUNTER
Was the patient seen in the last year in this department? Yes     Does patient have an active prescription for medications requested? No     Received Request Via: Pharmacy     Last visit:3/22/18

## 2018-07-11 DIAGNOSIS — M62.838 MUSCLE SPASTICITY: ICD-10-CM

## 2018-07-11 RX ORDER — CARISOPRODOL 350 MG/1
TABLET ORAL
Qty: 30 TAB | Refills: 0 | Status: SHIPPED
Start: 2018-07-11 | End: 2018-08-10

## 2018-07-11 NOTE — TELEPHONE ENCOUNTER
Was the patient seen in the last year in this department? Yes  #30 4/10/18  Does patient have an active prescription for medications requested? No     Received Request Via: Pharmacy   N2U 8/28/18

## 2018-07-26 RX ORDER — MELOXICAM 15 MG/1
15 TABLET ORAL
Qty: 90 TAB | Refills: 0 | Status: SHIPPED | OUTPATIENT
Start: 2018-07-26 | End: 2018-08-28

## 2018-07-26 NOTE — TELEPHONE ENCOUNTER
Was the patient seen in the last year in this department? Yes    Does patient have an active prescription for medications requested? No     Received Request Via: Pharmacy   New 2 U 8/28/18

## 2018-08-02 ENCOUNTER — HOSPITAL ENCOUNTER (OUTPATIENT)
Dept: RADIOLOGY | Facility: MEDICAL CENTER | Age: 58
End: 2018-08-02
Attending: OBSTETRICS & GYNECOLOGY
Payer: COMMERCIAL

## 2018-08-02 DIAGNOSIS — Z12.31 VISIT FOR SCREENING MAMMOGRAM: ICD-10-CM

## 2018-08-02 PROCEDURE — 77067 SCR MAMMO BI INCL CAD: CPT

## 2018-08-28 ENCOUNTER — OFFICE VISIT (OUTPATIENT)
Dept: MEDICAL GROUP | Facility: PHYSICIAN GROUP | Age: 58
End: 2018-08-28
Payer: COMMERCIAL

## 2018-08-28 VITALS
HEART RATE: 98 BPM | TEMPERATURE: 98.1 F | WEIGHT: 127 LBS | DIASTOLIC BLOOD PRESSURE: 74 MMHG | RESPIRATION RATE: 14 BRPM | SYSTOLIC BLOOD PRESSURE: 126 MMHG | OXYGEN SATURATION: 96 % | HEIGHT: 59 IN | BODY MASS INDEX: 25.6 KG/M2

## 2018-08-28 DIAGNOSIS — M25.512 CHRONIC LEFT SHOULDER PAIN: ICD-10-CM

## 2018-08-28 DIAGNOSIS — D35.2 PROLACTINOMA (HCC): ICD-10-CM

## 2018-08-28 DIAGNOSIS — M54.2 CHRONIC NECK PAIN: ICD-10-CM

## 2018-08-28 DIAGNOSIS — M54.12 LEFT CERVICAL RADICULOPATHY: ICD-10-CM

## 2018-08-28 DIAGNOSIS — E55.9 VITAMIN D INSUFFICIENCY: ICD-10-CM

## 2018-08-28 DIAGNOSIS — R74.01 ELEVATED ALT MEASUREMENT: ICD-10-CM

## 2018-08-28 DIAGNOSIS — G89.29 CHRONIC NECK PAIN: ICD-10-CM

## 2018-08-28 DIAGNOSIS — R73.03 PREDIABETES: ICD-10-CM

## 2018-08-28 DIAGNOSIS — E78.5 HYPERLIPIDEMIA, UNSPECIFIED HYPERLIPIDEMIA TYPE: ICD-10-CM

## 2018-08-28 DIAGNOSIS — I10 ESSENTIAL HYPERTENSION: ICD-10-CM

## 2018-08-28 DIAGNOSIS — G89.29 CHRONIC LEFT SHOULDER PAIN: ICD-10-CM

## 2018-08-28 DIAGNOSIS — M19.012 PRIMARY OSTEOARTHRITIS OF LEFT SHOULDER: ICD-10-CM

## 2018-08-28 DIAGNOSIS — Z91.09 ENVIRONMENTAL ALLERGIES: ICD-10-CM

## 2018-08-28 DIAGNOSIS — F32.A DEPRESSION, UNSPECIFIED DEPRESSION TYPE: ICD-10-CM

## 2018-08-28 PROCEDURE — 99214 OFFICE O/P EST MOD 30 MIN: CPT | Performed by: INTERNAL MEDICINE

## 2018-08-28 RX ORDER — CARISOPRODOL 350 MG/1
350 TABLET ORAL NIGHTLY PRN
Qty: 60 TAB | Refills: 0 | Status: SHIPPED | OUTPATIENT
Start: 2018-08-28 | End: 2018-11-26

## 2018-08-28 RX ORDER — CITALOPRAM 20 MG/1
30 TABLET ORAL DAILY
Qty: 135 TAB | Refills: 3 | Status: SHIPPED | OUTPATIENT
Start: 2018-08-28 | End: 2019-06-20

## 2018-08-28 RX ORDER — TRAMADOL HYDROCHLORIDE 50 MG/1
50 TABLET ORAL NIGHTLY PRN
Qty: 30 TAB | Refills: 0 | Status: SHIPPED | OUTPATIENT
Start: 2018-08-28 | End: 2018-08-31 | Stop reason: SDUPTHER

## 2018-08-29 NOTE — ASSESSMENT & PLAN NOTE
She tells me that she was switched from celexa to lexapro. But last one is not working as well as previous one. 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

## 2018-08-29 NOTE — ASSESSMENT & PLAN NOTE
Seems she had a fracture one year ago. She did have XR showed healed clavicular fracture, and moderate osteoarthiritis. She is still in pain. She uses sometimes at night soma, and sometimes else, when is worse tramadol. She use them very rare. She does not drink alcohol, or illegal drugs. Currently she is following with Dr. Everett . She has done injections, physical therapy. During the day she uses flexeril. Last time she took soma or tramadol was 4-5 days ago. No swelling

## 2018-08-29 NOTE — ASSESSMENT & PLAN NOTE
Chronic stable. She is not sure what she is allergic too. She uses albuterol if she is sick. During season she uses flonase. She denies sneezing, sob, wheezing.

## 2018-08-29 NOTE — ASSESSMENT & PLAN NOTE
Mostly that bothers is neck pain and shoulder pain.   C spine MRI 2008:  1. MILD CERVICAL KYPHOSIS WITH ITS EPICENTER AT THE C4-5 LEVEL.    2. SMALL LEFT PARACENTRAL DISC PROTRUSION AT THE C4-5 LEVEL WHICH ABUTS   AND EFFACES THE LEFT VENTRAL SURFACE OF THE CORD.    3. MINIMAL CERVICAL SPONDYLOTIC CHANGES AT THE C5-6 AND C6-7 LEVELS.    4. MILD MULTILEVEL NEURAL FORAMINAL NARROWING.  SEE BODY OF REPORT FOR   DETAILS

## 2018-08-29 NOTE — PROGRESS NOTES
"Subjective:   Marilou Arias Reyes is a 57 y.o. female here today for lab work, medication, establish,     Prolactinoma  High level of prolactin level 2011. She was seen by Dr. Hernandez. She has had brain MRI 2011: 9 mm \"nodular\" focus of enhancing tissue at the right lateral aspect of the sella which may represent dysmorphic residual pituitary tissue or residual tumor. There was slight deformities of optic chiasm. She reports retinal tear, but no changes on peripheral vision. She was advise she does not need to follow up anymore with endo.     Depression  She tells me that she was switched from celexa to lexapro. But last one is not working as well as previous one. 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     Environmental allergies  Chronic stable. She is not sure what she is allergic too. She uses albuterol if she is sick. During season she uses flonase. She denies sneezing, sob, wheezing.     Hypertension  She is on losartan 50 mg daily. No side effects. She gets dry cough from lisinopril.  She denies chest pain, shortness of breath, leg swelling, lightheadedness or blurred vision.    Hyperlipidemia  LDL slight elevated, still low cardiac pt. Calculate ascvd score next time. Primary prevention     Chronic neck pain  Mostly that bothers is neck pain and shoulder pain.   C spine MRI 2008:  1. MILD CERVICAL KYPHOSIS WITH ITS EPICENTER AT THE C4-5 LEVEL.    2. SMALL LEFT PARACENTRAL DISC PROTRUSION AT THE C4-5 LEVEL WHICH ABUTS   AND EFFACES THE LEFT VENTRAL SURFACE OF THE CORD.    3. MINIMAL CERVICAL SPONDYLOTIC CHANGES AT THE C5-6 AND C6-7 LEVELS.    4. MILD MULTILEVEL NEURAL FORAMINAL NARROWING.  SEE BODY OF REPORT FOR   DETAILS    Vitamin D insufficiency  On supplement, continue to monitor     Prediabetes  Diet control. a1c 5.9. Continue to monitor.     Chronic left shoulder pain  Seems she had a fracture one year ago. She did " have XR showed healed clavicular fracture, and moderate osteoarthiritis. She is still in pain. She uses sometimes at night soma, and sometimes else, when is worse tramadol. She use them very rare. She does not drink alcohol, or illegal drugs. Currently she is following with Dr. Everett . She has done injections, physical therapy. During the day she uses flexeril. Last time she took soma or tramadol was 4-5 days ago. No swelling        Current medicines (including changes today)  Current Outpatient Prescriptions   Medication Sig Dispense Refill   • citalopram (CELEXA) 20 MG Tab Take 1.5 Tabs by mouth every day. 135 Tab 3   • carisoprodol (SOMA) 350 MG Tab Take 1 Tab by mouth at bedtime as needed for Muscle Spasms for up to 90 days. 60 Tab 0   • tramadol (ULTRAM) 50 MG Tab Take 1 Tab by mouth at bedtime as needed for up to 90 days. 30 Tab 0   • cyclobenzaprine (FLEXERIL) 10 MG Tab TAKE ONE TABLET BY MOUTH THREE TIMES DAILY AS NEEDED 30 Tab 0   • MethylPREDNISolone (MEDROL DOSEPAK) 4 MG Tablet Therapy Pack As directed 1 Kit 0   • Diclofenac Sodium 1 % Gel APPLY 1 GRAM EXTERNALLY TO SKIN  OF AFFECTED AREA(S) FOUR TIMES DAILY 100 g 0   • losartan (COZAAR) 50 MG Tab TAKE ONE TABLET BY MOUTH ONE TIME DAILY 90 Tab 2   • ibuprofen (MOTRIN) 800 MG Tab Take 1 Tab by mouth every 8 hours as needed for Mild Pain. 30 Tab 3   • omeprazole (PRILOSEC) 20 MG delayed-release capsule TAKE ONE CAPSULE BY MOUTH ONE TIME DAILY 30 Cap 11   • albuterol 108 (90 BASE) MCG/ACT Aero Soln inhalation aerosol Inhale 2 Puffs by mouth every 6 hours as needed for Shortness of Breath. 8.5 g 0   • fluticasone (FLONASE) 50 MCG/ACT nasal spray Spray 1 Spray in nose every day. 16 g 3   • Cholecalciferol (VITAMIN D PO) Take 2,000 mg by mouth every day.     • Calcium 1500 MG Tab Take 1 Tab by mouth every day.     • Glucosamine HCl (GLUCOSAMINE PO) Take 1 Tab by mouth every day.     • Omega-3 Fatty Acids (FISH OIL) 1000 MG Cap capsule Take 1,000 mg by mouth  every day.     • Multiple Vitamins-Minerals (MULTIVITAMIN PO) Take 1 Tab by mouth every day.       No current facility-administered medications for this visit.      She  has a past medical history of Arthritis; Gestational diabetes; Hypertension; and Prolactinoma (HCC).    Current Outpatient Prescriptions   Medication Sig Dispense Refill   • citalopram (CELEXA) 20 MG Tab Take 1.5 Tabs by mouth every day. 135 Tab 3   • carisoprodol (SOMA) 350 MG Tab Take 1 Tab by mouth at bedtime as needed for Muscle Spasms for up to 90 days. 60 Tab 0   • tramadol (ULTRAM) 50 MG Tab Take 1 Tab by mouth at bedtime as needed for up to 90 days. 30 Tab 0   • cyclobenzaprine (FLEXERIL) 10 MG Tab TAKE ONE TABLET BY MOUTH THREE TIMES DAILY AS NEEDED 30 Tab 0   • MethylPREDNISolone (MEDROL DOSEPAK) 4 MG Tablet Therapy Pack As directed 1 Kit 0   • Diclofenac Sodium 1 % Gel APPLY 1 GRAM EXTERNALLY TO SKIN  OF AFFECTED AREA(S) FOUR TIMES DAILY 100 g 0   • losartan (COZAAR) 50 MG Tab TAKE ONE TABLET BY MOUTH ONE TIME DAILY 90 Tab 2   • ibuprofen (MOTRIN) 800 MG Tab Take 1 Tab by mouth every 8 hours as needed for Mild Pain. 30 Tab 3   • omeprazole (PRILOSEC) 20 MG delayed-release capsule TAKE ONE CAPSULE BY MOUTH ONE TIME DAILY 30 Cap 11   • albuterol 108 (90 BASE) MCG/ACT Aero Soln inhalation aerosol Inhale 2 Puffs by mouth every 6 hours as needed for Shortness of Breath. 8.5 g 0   • fluticasone (FLONASE) 50 MCG/ACT nasal spray Spray 1 Spray in nose every day. 16 g 3   • Cholecalciferol (VITAMIN D PO) Take 2,000 mg by mouth every day.     • Calcium 1500 MG Tab Take 1 Tab by mouth every day.     • Glucosamine HCl (GLUCOSAMINE PO) Take 1 Tab by mouth every day.     • Omega-3 Fatty Acids (FISH OIL) 1000 MG Cap capsule Take 1,000 mg by mouth every day.     • Multiple Vitamins-Minerals (MULTIVITAMIN PO) Take 1 Tab by mouth every day.       No current facility-administered medications for this visit.        Allergies as of 08/28/2018   • (No Known  "Allergies)       Social History     Social History   • Marital status:      Spouse name: N/A   • Number of children: N/A   • Years of education: N/A     Occupational History   • Not on file.     Social History Main Topics   • Smoking status: Never Smoker   • Smokeless tobacco: Never Used   • Alcohol use Yes      Comment: Rare   • Drug use: No   • Sexual activity: Yes     Partners: Male     Other Topics Concern   • Not on file     Social History Narrative   • No narrative on file        Family History   Problem Relation Age of Onset   • Lung Disease Mother         COPD   • Diabetes Mother    • Hypertension Mother    • Heart Disease Father    • Hypertension Father    • Cancer Sister         Breast   • Cancer Brother         Colon, prostate   • Alcohol/Drug Neg Hx    • Stroke Neg Hx        Past Surgical History:   Procedure Laterality Date   • CHOLECYSTECTOMY     • PRIMARY C SECTION         ROS   All systems reviewed are negative except for HPI       Objective:     Blood pressure 126/74, pulse 98, temperature 36.7 °C (98.1 °F), resp. rate 14, height 1.499 m (4' 11\"), weight 57.6 kg (127 lb), SpO2 96 %, not currently breastfeeding. Body mass index is 25.65 kg/m².   Physical Exam:  Constitutional: Alert, no distress.  Skin: Warm, dry, good turgor, no rashes in visible areas.  Eye: Equal, round and reactive, conjunctiva clear, lids normal.  ENMT: Lips without lesions, fair dentition, oropharynx clear.  Neck: Trachea midline, no masses, no thyromegaly. No cervical or supraclavicular lymphadenopathy  Respiratory: Unlabored respiratory effort, lungs clear to auscultation, no wheezes, no ronchi.  Cardiovascular: Normal S1, S2, no murmur, no edema.  Abdomen: Soft, non-tender, no masses, no hepatosplenomegaly.  Psych: Alert and oriented x3, normal affect and mood.        Assessment and Plan:   The following treatment plan was discussed    1. Environmental allergies  Stable. Continue same treatment     2. Depression, " unspecified depression type  Switch to citalopram. Counseling to monitor.   - CBC WITH DIFFERENTIAL; Future    3. Essential hypertension  Continue to monitor. Continue same meds   - COMP METABOLIC PANEL; Future  - MICROALBUMIN CREAT RATIO URINE; Future    4. Hyperlipidemia, unspecified hyperlipidemia type  Continue to monitor. Continue same meds   - COMP METABOLIC PANEL; Future  - LIPID PROFILE; Future    5. Chronic left shoulder pain  6. Chronic neck pain  Continue same meds. UDS. Well managed.   - COMP METABOLIC PANEL; Future  - carisoprodol (SOMA) 350 MG Tab; Take 1 Tab by mouth at bedtime as needed for Muscle Spasms for up to 90 days.  Dispense: 60 Tab; Refill: 0  - tramadol (ULTRAM) 50 MG Tab; Take 1 Tab by mouth at bedtime as needed for up to 90 days.  Dispense: 30 Tab; Refill: 0  - [facility] PAIN MANAGEMENT SCREEN; Future  - CONTROLLED SUBSTANCE TREATMENT AGREEMENT  - CONSENT FOR OPIATE PRESCRIPTION    7. Vitamin D insufficiency  Continue supplement, continue to monitor   - VITAMIN D,25 HYDROXY; Future    8. Prediabetes  Continue to monitor, no diabetes   - HEMOGLOBIN A1C; Future    9. Elevated ALT measurement  Resolved   - COMP METABOLIC PANEL; Future    10. Prolactinoma (HCC)  Continue to monitor   - CBC WITH DIFFERENTIAL; Future  - PROLACTIN; Future  - TSH; Future  - FREE THYROXINE; Future      Followup: Return in about 3 months (around 11/28/2018), or if symptoms worsen or fail to improve, for Short, medication management.

## 2018-08-29 NOTE — ASSESSMENT & PLAN NOTE
"High level of prolactin level 2011. She was seen by Dr. Hernandez. She has had brain MRI 2011: 9 mm \"nodular\" focus of enhancing tissue at the right lateral aspect of the sella which may represent dysmorphic residual pituitary tissue or residual tumor. There was slight deformities of optic chiasm. She reports retinal tear, but no changes on peripheral vision. She was advise she does not need to follow up anymore with endo.   "

## 2018-08-29 NOTE — ASSESSMENT & PLAN NOTE
She is on losartan 50 mg daily. No side effects. She gets dry cough from lisinopril.  She denies chest pain, shortness of breath, leg swelling, lightheadedness or blurred vision.

## 2018-08-31 DIAGNOSIS — G89.29 CHRONIC LEFT SHOULDER PAIN: ICD-10-CM

## 2018-08-31 DIAGNOSIS — G89.29 CHRONIC NECK PAIN: ICD-10-CM

## 2018-08-31 DIAGNOSIS — M54.2 CHRONIC NECK PAIN: ICD-10-CM

## 2018-08-31 DIAGNOSIS — M25.512 CHRONIC LEFT SHOULDER PAIN: ICD-10-CM

## 2018-08-31 DIAGNOSIS — M19.012 PRIMARY OSTEOARTHRITIS OF LEFT SHOULDER: ICD-10-CM

## 2018-08-31 RX ORDER — TRAMADOL HYDROCHLORIDE 50 MG/1
50 TABLET ORAL NIGHTLY PRN
Qty: 60 TAB | Refills: 0 | Status: SHIPPED | OUTPATIENT
Start: 2018-08-31 | End: 2018-11-29

## 2018-08-31 NOTE — TELEPHONE ENCOUNTER
"Was the patient seen in the last year in this department? Yes    Does patient have an active prescription for medications requested? No     Received Request Via: Pharmacy     Per pharmacy \"pt is supposed to be prescribed #60 for 90 day supply\"-Please Advise. LM   "

## 2018-09-17 ENCOUNTER — HOSPITAL ENCOUNTER (OUTPATIENT)
Dept: LAB | Facility: MEDICAL CENTER | Age: 58
End: 2018-09-17
Attending: INTERNAL MEDICINE
Payer: COMMERCIAL

## 2018-09-17 DIAGNOSIS — E78.5 HYPERLIPIDEMIA, UNSPECIFIED HYPERLIPIDEMIA TYPE: ICD-10-CM

## 2018-09-17 DIAGNOSIS — R74.01 ELEVATED ALT MEASUREMENT: ICD-10-CM

## 2018-09-17 DIAGNOSIS — G89.29 CHRONIC NECK PAIN: ICD-10-CM

## 2018-09-17 DIAGNOSIS — I10 ESSENTIAL HYPERTENSION: ICD-10-CM

## 2018-09-17 DIAGNOSIS — R73.03 PREDIABETES: ICD-10-CM

## 2018-09-17 DIAGNOSIS — M19.012 PRIMARY OSTEOARTHRITIS OF LEFT SHOULDER: ICD-10-CM

## 2018-09-17 DIAGNOSIS — D35.2 PROLACTINOMA (HCC): ICD-10-CM

## 2018-09-17 DIAGNOSIS — F32.A DEPRESSION, UNSPECIFIED DEPRESSION TYPE: ICD-10-CM

## 2018-09-17 DIAGNOSIS — M54.2 CHRONIC NECK PAIN: ICD-10-CM

## 2018-09-17 DIAGNOSIS — E55.9 VITAMIN D INSUFFICIENCY: ICD-10-CM

## 2018-09-17 LAB
25(OH)D3 SERPL-MCNC: 30 NG/ML (ref 30–100)
ALBUMIN SERPL BCP-MCNC: 4.6 G/DL (ref 3.2–4.9)
ALBUMIN/GLOB SERPL: 1.7 G/DL
ALP SERPL-CCNC: 56 U/L (ref 30–99)
ALT SERPL-CCNC: 41 U/L (ref 2–50)
ANION GAP SERPL CALC-SCNC: 10 MMOL/L (ref 0–11.9)
AST SERPL-CCNC: 28 U/L (ref 12–45)
BASOPHILS # BLD AUTO: 0.6 % (ref 0–1.8)
BASOPHILS # BLD: 0.04 K/UL (ref 0–0.12)
BILIRUB SERPL-MCNC: 0.5 MG/DL (ref 0.1–1.5)
BUN SERPL-MCNC: 16 MG/DL (ref 8–22)
CALCIUM SERPL-MCNC: 9.1 MG/DL (ref 8.5–10.5)
CHLORIDE SERPL-SCNC: 102 MMOL/L (ref 96–112)
CHOLEST SERPL-MCNC: 203 MG/DL (ref 100–199)
CO2 SERPL-SCNC: 27 MMOL/L (ref 20–33)
CREAT SERPL-MCNC: 0.73 MG/DL (ref 0.5–1.4)
CREAT UR-MCNC: 194.5 MG/DL
EOSINOPHIL # BLD AUTO: 0.19 K/UL (ref 0–0.51)
EOSINOPHIL NFR BLD: 2.9 % (ref 0–6.9)
ERYTHROCYTE [DISTWIDTH] IN BLOOD BY AUTOMATED COUNT: 37.4 FL (ref 35.9–50)
EST. AVERAGE GLUCOSE BLD GHB EST-MCNC: 134 MG/DL
FASTING STATUS PATIENT QL REPORTED: NORMAL
GLOBULIN SER CALC-MCNC: 2.7 G/DL (ref 1.9–3.5)
GLUCOSE SERPL-MCNC: 107 MG/DL (ref 65–99)
HBA1C MFR BLD: 6.3 % (ref 0–5.6)
HCT VFR BLD AUTO: 40.5 % (ref 37–47)
HDLC SERPL-MCNC: 53 MG/DL
HGB BLD-MCNC: 13.9 G/DL (ref 12–16)
IMM GRANULOCYTES # BLD AUTO: 0.02 K/UL (ref 0–0.11)
IMM GRANULOCYTES NFR BLD AUTO: 0.3 % (ref 0–0.9)
LDLC SERPL CALC-MCNC: 94 MG/DL
LYMPHOCYTES # BLD AUTO: 2.31 K/UL (ref 1–4.8)
LYMPHOCYTES NFR BLD: 35.8 % (ref 22–41)
MCH RBC QN AUTO: 30.2 PG (ref 27–33)
MCHC RBC AUTO-ENTMCNC: 34.3 G/DL (ref 33.6–35)
MCV RBC AUTO: 87.9 FL (ref 81.4–97.8)
MICROALBUMIN UR-MCNC: 7.5 MG/DL
MICROALBUMIN/CREAT UR: 39 MG/G (ref 0–30)
MONOCYTES # BLD AUTO: 0.38 K/UL (ref 0–0.85)
MONOCYTES NFR BLD AUTO: 5.9 % (ref 0–13.4)
NEUTROPHILS # BLD AUTO: 3.52 K/UL (ref 2–7.15)
NEUTROPHILS NFR BLD: 54.5 % (ref 44–72)
NRBC # BLD AUTO: 0 K/UL
NRBC BLD-RTO: 0 /100 WBC
PLATELET # BLD AUTO: 240 K/UL (ref 164–446)
PMV BLD AUTO: 10.3 FL (ref 9–12.9)
POTASSIUM SERPL-SCNC: 3.8 MMOL/L (ref 3.6–5.5)
PROLACTIN SERPL-MCNC: 28.17 NG/ML (ref 2.8–26)
PROT SERPL-MCNC: 7.3 G/DL (ref 6–8.2)
RBC # BLD AUTO: 4.61 M/UL (ref 4.2–5.4)
SODIUM SERPL-SCNC: 139 MMOL/L (ref 135–145)
T4 FREE SERPL-MCNC: 0.74 NG/DL (ref 0.53–1.43)
TRIGL SERPL-MCNC: 279 MG/DL (ref 0–149)
TSH SERPL DL<=0.005 MIU/L-ACNC: 1.12 UIU/ML (ref 0.38–5.33)
WBC # BLD AUTO: 6.5 K/UL (ref 4.8–10.8)

## 2018-09-17 PROCEDURE — 83036 HEMOGLOBIN GLYCOSYLATED A1C: CPT

## 2018-09-17 PROCEDURE — 36415 COLL VENOUS BLD VENIPUNCTURE: CPT

## 2018-09-17 PROCEDURE — 82306 VITAMIN D 25 HYDROXY: CPT

## 2018-09-17 PROCEDURE — 84439 ASSAY OF FREE THYROXINE: CPT

## 2018-09-17 PROCEDURE — 85025 COMPLETE CBC W/AUTO DIFF WBC: CPT

## 2018-09-17 PROCEDURE — 84146 ASSAY OF PROLACTIN: CPT

## 2018-09-17 PROCEDURE — 82570 ASSAY OF URINE CREATININE: CPT

## 2018-09-17 PROCEDURE — 84443 ASSAY THYROID STIM HORMONE: CPT

## 2018-09-17 PROCEDURE — 82043 UR ALBUMIN QUANTITATIVE: CPT

## 2018-09-17 PROCEDURE — 80061 LIPID PANEL: CPT

## 2018-09-17 PROCEDURE — 80053 COMPREHEN METABOLIC PANEL: CPT

## 2018-09-28 RX ORDER — CYCLOBENZAPRINE HCL 10 MG
TABLET ORAL
Qty: 90 TAB | Refills: 1 | Status: SHIPPED | OUTPATIENT
Start: 2018-09-28 | End: 2019-06-20 | Stop reason: SDUPTHER

## 2018-12-17 RX ORDER — CARISOPRODOL 350 MG/1
TABLET ORAL
Refills: 0 | OUTPATIENT
Start: 2018-12-17

## 2018-12-17 NOTE — TELEPHONE ENCOUNTER
Phone Number Called: [phone]     Message: I spoke with patient and she states she still has 10 tabs left and declined appt at this time, she will call for appt when needed.     Left Message for patient to call back: no

## 2018-12-21 RX ORDER — FLUTICASONE PROPIONATE 50 MCG
1 SPRAY, SUSPENSION (ML) NASAL DAILY
Qty: 16 G | Refills: 3 | Status: SHIPPED | OUTPATIENT
Start: 2018-12-21 | End: 2019-06-20 | Stop reason: SDUPTHER

## 2019-05-28 DIAGNOSIS — S20.212A RIB CONTUSION, LEFT, INITIAL ENCOUNTER: ICD-10-CM

## 2019-05-28 NOTE — TELEPHONE ENCOUNTER
Was the patient seen in the last year in this department? Yes  8/28/18  Does patient have an active prescription for medications requested? No     Received Request Via: Pharmacy

## 2019-06-20 ENCOUNTER — OFFICE VISIT (OUTPATIENT)
Dept: MEDICAL GROUP | Facility: MEDICAL CENTER | Age: 59
End: 2019-06-20
Payer: COMMERCIAL

## 2019-06-20 VITALS
DIASTOLIC BLOOD PRESSURE: 72 MMHG | WEIGHT: 123 LBS | RESPIRATION RATE: 14 BRPM | HEART RATE: 89 BPM | OXYGEN SATURATION: 95 % | TEMPERATURE: 97.6 F | HEIGHT: 59 IN | SYSTOLIC BLOOD PRESSURE: 110 MMHG | BODY MASS INDEX: 24.8 KG/M2

## 2019-06-20 DIAGNOSIS — G89.29 CHRONIC PAIN OF BOTH SHOULDERS: ICD-10-CM

## 2019-06-20 DIAGNOSIS — M25.512 CHRONIC LEFT SHOULDER PAIN: ICD-10-CM

## 2019-06-20 DIAGNOSIS — D35.2 PROLACTINOMA (HCC): ICD-10-CM

## 2019-06-20 DIAGNOSIS — R73.03 PREDIABETES: ICD-10-CM

## 2019-06-20 DIAGNOSIS — M25.511 CHRONIC PAIN OF BOTH SHOULDERS: ICD-10-CM

## 2019-06-20 DIAGNOSIS — E78.5 HYPERLIPIDEMIA, UNSPECIFIED HYPERLIPIDEMIA TYPE: ICD-10-CM

## 2019-06-20 DIAGNOSIS — M54.9 BACK PAIN, UNSPECIFIED BACK LOCATION, UNSPECIFIED BACK PAIN LATERALITY, UNSPECIFIED CHRONICITY: ICD-10-CM

## 2019-06-20 DIAGNOSIS — M25.512 CHRONIC PAIN OF BOTH SHOULDERS: ICD-10-CM

## 2019-06-20 DIAGNOSIS — G89.29 CHRONIC LEFT SHOULDER PAIN: ICD-10-CM

## 2019-06-20 DIAGNOSIS — I10 ESSENTIAL HYPERTENSION: ICD-10-CM

## 2019-06-20 DIAGNOSIS — Z91.09 ENVIRONMENTAL ALLERGIES: ICD-10-CM

## 2019-06-20 DIAGNOSIS — R05.3 PERSISTENT COUGH FOR 3 WEEKS OR LONGER: ICD-10-CM

## 2019-06-20 DIAGNOSIS — J45.20 MILD INTERMITTENT REACTIVE AIRWAY DISEASE WITHOUT COMPLICATION: ICD-10-CM

## 2019-06-20 PROBLEM — J45.909 REACTIVE AIRWAY DISEASE WITHOUT COMPLICATION: Status: ACTIVE | Noted: 2019-06-20

## 2019-06-20 PROCEDURE — 99214 OFFICE O/P EST MOD 30 MIN: CPT | Performed by: FAMILY MEDICINE

## 2019-06-20 RX ORDER — OMEPRAZOLE 20 MG/1
CAPSULE, DELAYED RELEASE ORAL
Qty: 90 CAP | Refills: 3 | Status: SHIPPED | OUTPATIENT
Start: 2019-06-20 | End: 2020-12-08

## 2019-06-20 RX ORDER — CARISOPRODOL 350 MG/1
350 TABLET ORAL 4 TIMES DAILY PRN
Qty: 84 TAB | Refills: 0 | Status: SHIPPED | OUTPATIENT
Start: 2019-06-20 | End: 2019-06-20 | Stop reason: SDUPTHER

## 2019-06-20 RX ORDER — LOSARTAN POTASSIUM 50 MG/1
TABLET ORAL
Qty: 90 TAB | Refills: 3 | Status: SHIPPED | OUTPATIENT
Start: 2019-06-20 | End: 2020-04-01

## 2019-06-20 RX ORDER — ASCORBIC ACID 500 MG
500 TABLET ORAL DAILY
COMMUNITY

## 2019-06-20 RX ORDER — CYCLOBENZAPRINE HCL 10 MG
TABLET ORAL
Qty: 90 TAB | Refills: 1 | Status: SHIPPED | OUTPATIENT
Start: 2019-06-20 | End: 2020-04-28

## 2019-06-20 RX ORDER — FLUTICASONE PROPIONATE 50 MCG
1 SPRAY, SUSPENSION (ML) NASAL DAILY
Qty: 2 BOTTLE | Refills: 11 | Status: SHIPPED | OUTPATIENT
Start: 2019-06-20

## 2019-06-20 RX ORDER — IBUPROFEN 800 MG/1
800 TABLET ORAL EVERY 8 HOURS PRN
Qty: 90 TAB | Refills: 3 | Status: SHIPPED | OUTPATIENT
Start: 2019-06-20 | End: 2021-03-23 | Stop reason: SDUPTHER

## 2019-06-20 RX ORDER — METHYLPREDNISOLONE 4 MG/1
TABLET ORAL
Qty: 21 TAB | Refills: 0 | Status: SHIPPED | OUTPATIENT
Start: 2019-06-20 | End: 2021-09-23

## 2019-06-20 RX ORDER — CARISOPRODOL 350 MG/1
350 TABLET ORAL 4 TIMES DAILY PRN
Qty: 84 TAB | Refills: 0 | Status: SHIPPED | OUTPATIENT
Start: 2019-06-20 | End: 2019-07-11

## 2019-06-20 RX ORDER — ALBUTEROL SULFATE 90 UG/1
2 AEROSOL, METERED RESPIRATORY (INHALATION) EVERY 6 HOURS PRN
Qty: 8.5 G | Refills: 11 | Status: SHIPPED | OUTPATIENT
Start: 2019-06-20 | End: 2021-05-03 | Stop reason: SDUPTHER

## 2019-06-20 ASSESSMENT — PATIENT HEALTH QUESTIONNAIRE - PHQ9: CLINICAL INTERPRETATION OF PHQ2 SCORE: 0

## 2019-06-20 NOTE — ASSESSMENT & PLAN NOTE
Lab Results   Component Value Date/Time    HBA1C 6.3 (H) 09/17/2018 07:53 AM      I encourage clinic visits and labs q 6 mo

## 2019-06-20 NOTE — PROGRESS NOTES
This medical record contains text that has been entered with the assistance of computer voice recognition and dictation software.  Therefore, it may contain unintended errors in text, spelling, punctuation, or grammar        Chief Complaint   Patient presents with   • Establish Care     needs new pcp    • Shoulder Pain     left shoulder px follow up        Marilou Arias Reyes is a 58 y.o. 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     • cyclobenzaprine (FLEXERIL) 10 MG Tab TAKE ONE TABLET BY MOUTH THREE TIMES DAILY AS NEEDED 90 Tab 1   • ibuprofen (MOTRIN) 800 MG Tab Take 1 Tab by mouth every 8 hours as needed for Mild Pain. 90 Tab 3   • losartan (COZAAR) 50 MG Tab TAKE ONE TABLET BY MOUTH ONE TIME DAILY 90 Tab 3   • omeprazole (PRILOSEC) 20 MG delayed-release capsule TAKE ONE CAPSULE BY MOUTH ONE TIME DAILY 90 Cap 3   • albuterol 108 (90 Base) MCG/ACT Aero Soln inhalation aerosol Inhale 2 Puffs by mouth every 6 hours as needed for Shortness of Breath. 8.5 g 11   • fluticasone (FLONASE) 50 MCG/ACT nasal spray Spray 1 Spray in nose every day. 2 Bottle 11   • methylPREDNISolone (MEDROL DOSEPAK) 4 MG Tablet Therapy Pack As directed on the packaging label. 21 Tab 0   • carisoprodol (SOMA) 350 MG Tab Take 1 Tab by mouth 4 times a day as needed for Muscle Spasms for up to 21 days. 84 Tab 0   • Cholecalciferol (VITAMIN D PO) Take 2,000 mg by mouth every day.     • Calcium 1500 MG Tab Take 1 Tab by mouth every day.     • Glucosamine HCl (GLUCOSAMINE PO) Take 1 Tab by mouth every day.     • Omega-3 Fatty Acids (FISH OIL) 1000 MG Cap capsule Take 1,000 mg by mouth every  "day.     • Multiple Vitamins-Minerals (MULTIVITAMIN PO) Take 1 Tab by mouth every day.     • Diclofenac Sodium 1 % Gel APPLY 1 GRAM EXTERNALLY TO SKIN  OF AFFECTED AREA(S) FOUR TIMES DAILY 100 g 0     No current facility-administered medications for this visit.      Patient Active Problem List    Diagnosis Date Noted   • Reactive airway disease without complication 06/20/2019   • Chronic left shoulder pain 05/26/2017   • Prediabetes 04/01/2016   • Environmental allergies 01/28/2016   • Hypertension 01/28/2016   • Hyperlipidemia 01/28/2016   • Chronic neck pain 01/28/2016   • Vitamin D insufficiency 01/28/2016   • Depression 11/01/2013     Past Surgical History:   Procedure Laterality Date   • CHOLECYSTECTOMY     • PRIMARY C SECTION        Social History   Substance Use Topics   • Smoking status: Never Smoker   • Smokeless tobacco: Never Used   • Alcohol use Yes      Comment: Rare     Family History   Problem Relation Age of Onset   • Lung Disease Mother         COPD   • Diabetes Mother    • Hypertension Mother    • Heart Disease Father    • Hypertension Father    • Cancer Sister         Breast   • Cancer Brother         Colon, prostate   • Alcohol/Drug Neg Hx    • Stroke Neg Hx            ROS    all review of system completed and negative except for those listed above     Objective:     /72 (BP Location: Left arm, Patient Position: Sitting, BP Cuff Size: Adult)   Pulse 89   Temp 36.4 °C (97.6 °F) (Temporal)   Resp 14   Ht 1.499 m (4' 11\")   Wt 55.8 kg (123 lb)   SpO2 95%  Body mass index is 24.84 kg/m².  Physical Exam:    Constitutional: Alert, no distress.  Skin: Warm, dry, good turgor, no rashes in visible areas.  Eye: Equal, round and reactive, conjunctiva clear, lids normal.  ENMT: Lips without lesions, good dentition, oropharynx clear.  Neck: Trachea midline, no masses, no thyromegaly. No cervical or supraclavicular lymphadenopathy.  Respiratory: Unlabored respiratory effort, lungs clear to " auscultation, no wheezes, no ronchi.  Cardiovascular: Normal S1, S2, no murmur, no edema.  Abdomen: Soft, non-tender, no masses, no hepatosplenomegaly.  Psych: Alert and oriented x3, normal affect and mood.    [facility]  No swelling deformity   + painful arc   +empty can bilateral  R/u/m motor in tact               Assessment and Plan:   The following treatment plan was discussed        Problem List Items Addressed This Visit     Environmental allergies     flonase            Hypertension     At goal continue current txt   Labs and clinic visits q6 mo                Relevant Medications    losartan (COZAAR) 50 MG Tab    Hyperlipidemia     Diet only for now   Will continue to monitory             Relevant Medications    losartan (COZAAR) 50 MG Tab    Prediabetes     Lab Results   Component Value Date/Time    HBA1C 6.3 (H) 09/17/2018 07:53 AM      I encourage clinic visits and labs q 6 mo              Relevant Orders    HEMOGLOBIN A1C    Basic Metabolic Panel    Lipid Profile    MICROALBUMIN CREAT RATIO URINE    Chronic left shoulder pain     This is clearly a work aggravated condition that she has been suffering with for some time   She is short and the shelves at work are too tall for her   I suggest that the best course of action for her is to seek occ health evaluation through her current employer who hopefully will establish record as well as take over management plan for her and correct underlying problem       Letter provided  See orders   Medrol dose pack for prn use requested   NSAID  Muscle relax               Relevant Medications    cyclobenzaprine (FLEXERIL) 10 MG Tab    ibuprofen (MOTRIN) 800 MG Tab    methylPREDNISolone (MEDROL DOSEPAK) 4 MG Tablet Therapy Pack    carisoprodol (SOMA) 350 MG Tab    Reactive airway disease without complication     Takes albuterol prn                Relevant Medications    albuterol 108 (90 Base) MCG/ACT Aero Soln inhalation aerosol    fluticasone (FLONASE) 50 MCG/ACT nasal spray     RESOLVED: Prolactinoma (HCC)      Other Visit Diagnoses     Chronic pain of both shoulders        Relevant Medications    cyclobenzaprine (FLEXERIL) 10 MG Tab    omeprazole (PRILOSEC) 20 MG delayed-release capsule    methylPREDNISolone (MEDROL DOSEPAK) 4 MG Tablet Therapy Pack    carisoprodol (SOMA) 350 MG Tab    Back pain, unspecified back location, unspecified back pain laterality, unspecified chronicity        Relevant Medications    cyclobenzaprine (FLEXERIL) 10 MG Tab    ibuprofen (MOTRIN) 800 MG Tab    omeprazole (PRILOSEC) 20 MG delayed-release capsule    methylPREDNISolone (MEDROL DOSEPAK) 4 MG Tablet Therapy Pack    carisoprodol (SOMA) 350 MG Tab    Persistent cough for 3 weeks or longer                    Instructed to follow up if symptoms worsen or fail to improve, ER/UC precautions discussed as well    Geno Benitez MD  [facility], Family Medicine   [address] [place] [address]   Al[place]to ALVAREZ [id number]  Phone: [phone]

## 2019-06-20 NOTE — ASSESSMENT & PLAN NOTE
This is clearly a work aggravated condition that she has been suffering with for some time   She is short and the shelves at work are too tall for her   I suggest that the best course of action for her is to seek occ health evaluation through her current employer who hopefully will establish record as well as take over management plan for her and correct underlying problem       Letter provided  See orders   Medrol dose pack for prn use requested   NSAID  Muscle relax

## 2019-06-20 NOTE — LETTER
To whom it may concern  -   Marilou Reyes is a patient of mine and in my opinion she has work aggravated condition and I have recommended that she seek occupational medicine evaluation through her current employer, [employer],   Dr. Geno Benitez MD  6/20/2019

## 2019-06-28 ENCOUNTER — HOSPITAL ENCOUNTER (OUTPATIENT)
Dept: LAB | Facility: MEDICAL CENTER | Age: 59
End: 2019-06-28
Attending: FAMILY MEDICINE
Payer: COMMERCIAL

## 2019-06-28 DIAGNOSIS — R73.03 PREDIABETES: ICD-10-CM

## 2019-06-28 LAB
ANION GAP SERPL CALC-SCNC: 11 MMOL/L (ref 0–11.9)
BUN SERPL-MCNC: 21 MG/DL (ref 8–22)
CALCIUM SERPL-MCNC: 9.6 MG/DL (ref 8.5–10.5)
CHLORIDE SERPL-SCNC: 103 MMOL/L (ref 96–112)
CHOLEST SERPL-MCNC: 174 MG/DL (ref 100–199)
CO2 SERPL-SCNC: 27 MMOL/L (ref 20–33)
CREAT SERPL-MCNC: 0.84 MG/DL (ref 0.5–1.4)
CREAT UR-MCNC: 199 MG/DL
EST. AVERAGE GLUCOSE BLD GHB EST-MCNC: 137 MG/DL
FASTING STATUS PATIENT QL REPORTED: NORMAL
GLUCOSE SERPL-MCNC: 103 MG/DL (ref 65–99)
HBA1C MFR BLD: 6.4 % (ref 0–5.6)
HDLC SERPL-MCNC: 45 MG/DL
LDLC SERPL CALC-MCNC: 82 MG/DL
MICROALBUMIN UR-MCNC: 1 MG/DL
MICROALBUMIN/CREAT UR: 5 MG/G (ref 0–30)
POTASSIUM SERPL-SCNC: 4.3 MMOL/L (ref 3.6–5.5)
SODIUM SERPL-SCNC: 141 MMOL/L (ref 135–145)
TRIGL SERPL-MCNC: 235 MG/DL (ref 0–149)

## 2019-06-28 PROCEDURE — 80061 LIPID PANEL: CPT

## 2019-06-28 PROCEDURE — 83036 HEMOGLOBIN GLYCOSYLATED A1C: CPT

## 2019-06-28 PROCEDURE — 80048 BASIC METABOLIC PNL TOTAL CA: CPT

## 2019-06-28 PROCEDURE — 36415 COLL VENOUS BLD VENIPUNCTURE: CPT

## 2019-06-28 PROCEDURE — 82570 ASSAY OF URINE CREATININE: CPT

## 2019-06-28 PROCEDURE — 82043 UR ALBUMIN QUANTITATIVE: CPT

## 2019-09-24 ENCOUNTER — APPOINTMENT (OUTPATIENT)
Dept: RADIOLOGY | Facility: MEDICAL CENTER | Age: 59
End: 2019-09-24
Attending: OBSTETRICS & GYNECOLOGY
Payer: COMMERCIAL

## 2019-10-04 ENCOUNTER — HOSPITAL ENCOUNTER (OUTPATIENT)
Dept: RADIOLOGY | Facility: MEDICAL CENTER | Age: 59
End: 2019-10-04
Attending: OBSTETRICS & GYNECOLOGY
Payer: COMMERCIAL

## 2019-10-04 DIAGNOSIS — Z12.31 VISIT FOR SCREENING MAMMOGRAM: ICD-10-CM

## 2019-10-04 PROCEDURE — 77063 BREAST TOMOSYNTHESIS BI: CPT

## 2020-04-01 ENCOUNTER — OFFICE VISIT (OUTPATIENT)
Dept: MEDICAL GROUP | Facility: MEDICAL CENTER | Age: 60
End: 2020-04-01
Payer: COMMERCIAL

## 2020-04-01 VITALS
WEIGHT: 122 LBS | SYSTOLIC BLOOD PRESSURE: 136 MMHG | BODY MASS INDEX: 24.6 KG/M2 | DIASTOLIC BLOOD PRESSURE: 88 MMHG | HEART RATE: 99 BPM | HEIGHT: 59 IN

## 2020-04-01 DIAGNOSIS — M54.2 CHRONIC NECK PAIN WITH OSSIFICATION OF POSTERIOR LONGITUDINAL LIGAMENT PRESENT ON X-RAY (HCC): ICD-10-CM

## 2020-04-01 DIAGNOSIS — J01.80 OTHER SUBACUTE SINUSITIS: ICD-10-CM

## 2020-04-01 DIAGNOSIS — I15.9 SECONDARY HYPERTENSION: ICD-10-CM

## 2020-04-01 DIAGNOSIS — G89.29 CHRONIC NECK PAIN WITH OSSIFICATION OF POSTERIOR LONGITUDINAL LIGAMENT PRESENT ON X-RAY (HCC): ICD-10-CM

## 2020-04-01 DIAGNOSIS — M48.8X2 CHRONIC NECK PAIN WITH OSSIFICATION OF POSTERIOR LONGITUDINAL LIGAMENT PRESENT ON X-RAY (HCC): ICD-10-CM

## 2020-04-01 DIAGNOSIS — E13.9 OTHER SPECIFIED DIABETES MELLITUS WITHOUT COMPLICATION, WITHOUT LONG-TERM CURRENT USE OF INSULIN (HCC): ICD-10-CM

## 2020-04-01 PROCEDURE — 99214 OFFICE O/P EST MOD 30 MIN: CPT | Mod: 95,CR | Performed by: FAMILY MEDICINE

## 2020-04-01 RX ORDER — CARISOPRODOL 350 MG/1
350 TABLET ORAL 4 TIMES DAILY
Qty: 240 TAB | Refills: 0 | Status: SHIPPED | OUTPATIENT
Start: 2020-04-01 | End: 2021-03-23 | Stop reason: SDUPTHER

## 2020-04-01 RX ORDER — AZITHROMYCIN 250 MG/1
TABLET, FILM COATED ORAL
Qty: 6 TAB | Refills: 0 | Status: SHIPPED
Start: 2020-04-01 | End: 2021-03-23

## 2020-04-01 RX ORDER — LOSARTAN POTASSIUM 50 MG/1
TABLET ORAL
Qty: 90 TAB | Refills: 2 | Status: SHIPPED | OUTPATIENT
Start: 2020-04-01 | End: 2021-03-23 | Stop reason: SDUPTHER

## 2020-04-01 ASSESSMENT — PATIENT HEALTH QUESTIONNAIRE - PHQ9
4. FEELING TIRED OR HAVING LITTLE ENERGY: NOT AT ALL
2. FEELING DOWN, DEPRESSED, IRRITABLE, OR HOPELESS: NOT AT ALL
3. TROUBLE FALLING OR STAYING ASLEEP OR SLEEPING TOO MUCH: NOT AT ALL
SUM OF ALL RESPONSES TO PHQ9 QUESTIONS 1 AND 2: 0
1. LITTLE INTEREST OR PLEASURE IN DOING THINGS: NOT AT ALL
6. FEELING BAD ABOUT YOURSELF - OR THAT YOU ARE A FAILURE OR HAVE LET YOURSELF OR YOUR FAMILY DOWN: NOT AL ALL
7. TROUBLE CONCENTRATING ON THINGS, SUCH AS READING THE NEWSPAPER OR WATCHING TELEVISION: NOT AT ALL
9. THOUGHTS THAT YOU WOULD BE BETTER OFF DEAD, OR OF HURTING YOURSELF: NOT AT ALL
SUM OF ALL RESPONSES TO PHQ QUESTIONS 1-9: 0
8. MOVING OR SPEAKING SO SLOWLY THAT OTHER PEOPLE COULD HAVE NOTICED. OR THE OPPOSITE, BEING SO FIGETY OR RESTLESS THAT YOU HAVE BEEN MOVING AROUND A LOT MORE THAN USUAL: NOT AT ALL
5. POOR APPETITE OR OVEREATING: NOT AT ALL

## 2020-04-01 ASSESSMENT — FIBROSIS 4 INDEX: FIB4 SCORE: 1.06

## 2020-04-01 NOTE — PROGRESS NOTES
"Telemedicine Visit: Established Patient     This encounter was conducted via [employer] (we attempted zoom in [employer], patient was able to log into [employer] download zoom and eneter appointment prompted with 'to begin appointment click link below\" there was no link below   Verbal consent was obtained. Patient's identity was verified.    Subjective:   CC: prediabetes HTN follow up   Marilou Arias Reyes is a 59 y.o. 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 No nausea or vomiting. No chest pains or shortness of breath.     No Known Allergies    Current medicines (including changes today)  Current Outpatient Medications   Medication Sig Dispense Refill   • azithromycin (ZITHROMAX) 250 MG Tab Take 2 tabs on day 1, then 1 tab on day 2-5 6 Tab 0   • carisoprodol (SOMA) 350 MG Tab Take 1 Tab by mouth 4 times a day for 60 days. 240 Tab 0   • ascorbic acid (ASCORBIC ACID) 500 MG Tab Take 500 mg by mouth every day.     • cyclobenzaprine (FLEXERIL) 10 MG Tab TAKE ONE TABLET BY MOUTH THREE TIMES DAILY AS NEEDED 90 Tab 1   • ibuprofen (MOTRIN) 800 MG Tab Take 1 Tab by mouth every 8 hours as needed for Mild Pain. 90 Tab 3   • losartan (COZAAR) 50 MG Tab TAKE ONE TABLET BY MOUTH ONE TIME DAILY 90 Tab 3   • omeprazole (PRILOSEC) 20 MG delayed-release capsule TAKE ONE CAPSULE BY MOUTH ONE TIME DAILY 90 Cap 3   • albuterol 108 (90 Base) MCG/ACT Aero Soln inhalation aerosol Inhale 2 Puffs by mouth every 6 hours as needed for Shortness of Breath. 8.5 g 11   • fluticasone (FLONASE) 50 MCG/ACT nasal spray Spray 1 Spray in nose every day. 2 Bottle 11   • Diclofenac Sodium 1 % Gel APPLY 1 GRAM EXTERNALLY TO SKIN  OF AFFECTED AREA(S) FOUR TIMES DAILY 100 g 0   • Cholecalciferol (VITAMIN D PO) Take 2,000 mg by mouth " "every day.     • Calcium 1500 MG Tab Take 1 Tab by mouth every day.     • Glucosamine HCl (GLUCOSAMINE PO) Take 1 Tab by mouth every day.     • Omega-3 Fatty Acids (FISH OIL) 1000 MG Cap capsule Take 1,000 mg by mouth every day.     • Multiple Vitamins-Minerals (MULTIVITAMIN PO) Take 1 Tab by mouth every day.     • methylPREDNISolone (MEDROL DOSEPAK) 4 MG Tablet Therapy Pack As directed on the packaging label. (Patient not taking: Reported on 4/1/2020) 21 Tab 0     No current facility-administered medications for this visit.        Patient Active Problem List    Diagnosis Date Noted   • Reactive airway disease without complication 06/20/2019   • Chronic left shoulder pain 05/26/2017   • Prediabetes 04/01/2016   • Environmental allergies 01/28/2016   • Hypertension 01/28/2016   • Hyperlipidemia 01/28/2016   • Chronic neck pain 01/28/2016   • Vitamin D insufficiency 01/28/2016   • Depression 11/01/2013       Family History   Problem Relation Age of Onset   • Lung Disease Mother         COPD   • Diabetes Mother    • Hypertension Mother    • Heart Disease Father    • Hypertension Father    • Cancer Sister         Breast   • Cancer Brother         Colon, prostate   • Alcohol/Drug Neg Hx    • Stroke Neg Hx        She  has a past medical history of Arthritis, Gestational diabetes, Hypertension, and Prolactinoma (HCC).  She  has a past surgical history that includes primary c section and cholecystectomy.       Objective:   Vitals obtained by patient:  See below for BP and pulse   Heigh 4'11\"    Physical Exam:  Constitutional: Alert, no distress, well-groomed.  Skin: No rashes in visible areas.  Eye: Round. Conjunctiva clear, lids normal. No icterus.   ENMT: Lips pink without lesions, good dentition, moist mucous membranes. Phonation normal.  Neck: No masses, no thyromegaly. Moves freely without pain.  CV: Pulse as reported by patient  Respiratory: Unlabored respiratory effort, no cough or audible wheeze  Psych: Alert and " oriented x3, normal affect and mood.       Assessment and Plan:   The following treatment plan was discussed:     1. Secondary hypertension  - Basic Metabolic Panel; Future  - Lipid Profile; Future  - MICROALBUMIN CREAT RATIO URINE; Future  - HEMOGLOBIN A1C; Future    2. Other specified diabetes mellitus without complication, without long-term current use of insulin (HCC)  - Basic Metabolic Panel; Future  - Lipid Profile; Future  - MICROALBUMIN CREAT RATIO URINE; Future  - HEMOGLOBIN A1C; Future    3. Chronic neck pain with ossification of posterior longitudinal ligament present on x-ray  - REFERRAL TO PHYSIATRY (PMR)  She takes soma and NSAIDS regularly     She is asking for Rx for NSAIDs which she takes daily  I am not comfortable with this   She is asking for soma   I am ok with this     Realizing how severe her neck pain is I suggest she consult with physiatry       4. Other subacute sinusitis  - azithromycin (ZITHROMAX) 250 MG Tab; Take 2 tabs on day 1, then 1 tab on day 2-5  Dispense: 6 Tab; Refill: 0  - carisoprodol (SOMA) 350 MG Tab; Take 1 Tab by mouth 4 times a day for 60 days.  Dispense: 240 Tab; Refill: 0      Problem List Items Addressed This Visit     Hypertension     Home BP reading 138/88  Pulse 99  No adjustments today   She will monitor at home and she will reach out if persistently elevated     Plan labs and in person visit in 3 mo            Relevant Orders    Basic Metabolic Panel    Lipid Profile    MICROALBUMIN CREAT RATIO URINE    HEMOGLOBIN A1C      Other Visit Diagnoses     Other specified diabetes mellitus without complication, without long-term current use of insulin (HCC)        Relevant Orders    Basic Metabolic Panel    Lipid Profile    MICROALBUMIN CREAT RATIO URINE    HEMOGLOBIN A1C    Chronic neck pain with ossification of posterior longitudinal ligament present on x-ray        Relevant Medications    carisoprodol (SOMA) 350 MG Tab    Other Relevant Orders    REFERRAL TO PHYSIATRY  (PMR)    Other subacute sinusitis        Relevant Medications    azithromycin (ZITHROMAX) 250 MG Tab    carisoprodol (SOMA) 350 MG Tab        Over 25 minutes spent with patient face to face, greater than 50% time spent with plan/coordination of care regarding that which is discussed in the HPI and A&P    Follow-up: No follow-ups on file.       Geno Benitez M.D.

## 2020-04-01 NOTE — ASSESSMENT & PLAN NOTE
Plan will be to continue to check labs and do clinic visits periodically at least every 6 mo    Over 25 minutes spent with patient face to face, greater than 50% time spent with plan/coordination of care regarding that which is discussed in the HPI and A&P

## 2020-04-01 NOTE — ASSESSMENT & PLAN NOTE
Home BP reading 138/88  Pulse 99  No adjustments today   She will monitor at home and she will reach out if persistently elevated     Plan labs and in person visit in 3 mo

## 2020-04-01 NOTE — ASSESSMENT & PLAN NOTE
She takes soma and NSAIDS regularly     She is asking for Rx for NSAIDs which she takes daily  I am not comfortable with this   She is asking for soma   I am ok with this     Realizing how severe her neck pain is I suggest she consult with physiatry

## 2020-04-23 ENCOUNTER — APPOINTMENT (OUTPATIENT)
Dept: PHYSICAL MEDICINE AND REHAB | Facility: MEDICAL CENTER | Age: 60
End: 2020-04-23
Payer: COMMERCIAL

## 2020-04-24 DIAGNOSIS — G89.29 CHRONIC PAIN OF BOTH SHOULDERS: ICD-10-CM

## 2020-04-24 DIAGNOSIS — M25.512 CHRONIC PAIN OF BOTH SHOULDERS: ICD-10-CM

## 2020-04-24 DIAGNOSIS — M25.511 CHRONIC PAIN OF BOTH SHOULDERS: ICD-10-CM

## 2020-04-28 RX ORDER — CYCLOBENZAPRINE HCL 10 MG
TABLET ORAL
Qty: 90 TAB | Refills: 0 | Status: SHIPPED | OUTPATIENT
Start: 2020-04-28 | End: 2021-03-29

## 2020-06-15 DIAGNOSIS — S20.212A RIB CONTUSION, LEFT, INITIAL ENCOUNTER: ICD-10-CM

## 2020-06-30 ENCOUNTER — HOSPITAL ENCOUNTER (OUTPATIENT)
Dept: LAB | Facility: MEDICAL CENTER | Age: 60
End: 2020-06-30
Attending: FAMILY MEDICINE
Payer: COMMERCIAL

## 2020-06-30 DIAGNOSIS — I15.9 SECONDARY HYPERTENSION: ICD-10-CM

## 2020-06-30 DIAGNOSIS — E13.9 OTHER SPECIFIED DIABETES MELLITUS WITHOUT COMPLICATION, WITHOUT LONG-TERM CURRENT USE OF INSULIN (HCC): ICD-10-CM

## 2020-06-30 LAB
ANION GAP SERPL CALC-SCNC: 11 MMOL/L (ref 7–16)
BUN SERPL-MCNC: 16 MG/DL (ref 8–22)
CALCIUM SERPL-MCNC: 9.4 MG/DL (ref 8.5–10.5)
CHLORIDE SERPL-SCNC: 101 MMOL/L (ref 96–112)
CHOLEST SERPL-MCNC: 224 MG/DL (ref 100–199)
CO2 SERPL-SCNC: 28 MMOL/L (ref 20–33)
CREAT SERPL-MCNC: 0.63 MG/DL (ref 0.5–1.4)
CREAT UR-MCNC: 88.84 MG/DL
EST. AVERAGE GLUCOSE BLD GHB EST-MCNC: 134 MG/DL
FASTING STATUS PATIENT QL REPORTED: NORMAL
GLUCOSE SERPL-MCNC: 108 MG/DL (ref 65–99)
HBA1C MFR BLD: 6.3 % (ref 0–5.6)
HDLC SERPL-MCNC: 55 MG/DL
LDLC SERPL CALC-MCNC: 100 MG/DL
MICROALBUMIN UR-MCNC: 17.4 MG/DL
MICROALBUMIN/CREAT UR: 196 MG/G (ref 0–30)
POTASSIUM SERPL-SCNC: 4.3 MMOL/L (ref 3.6–5.5)
SODIUM SERPL-SCNC: 140 MMOL/L (ref 135–145)
TRIGL SERPL-MCNC: 345 MG/DL (ref 0–149)

## 2020-06-30 PROCEDURE — 82570 ASSAY OF URINE CREATININE: CPT

## 2020-06-30 PROCEDURE — 36415 COLL VENOUS BLD VENIPUNCTURE: CPT

## 2020-06-30 PROCEDURE — 83036 HEMOGLOBIN GLYCOSYLATED A1C: CPT

## 2020-06-30 PROCEDURE — 82043 UR ALBUMIN QUANTITATIVE: CPT

## 2020-06-30 PROCEDURE — 80048 BASIC METABOLIC PNL TOTAL CA: CPT

## 2020-06-30 PROCEDURE — 80061 LIPID PANEL: CPT

## 2020-07-15 ENCOUNTER — TELEMEDICINE (OUTPATIENT)
Dept: MEDICAL GROUP | Facility: MEDICAL CENTER | Age: 60
End: 2020-07-15
Payer: COMMERCIAL

## 2020-07-15 VITALS
HEART RATE: 99 BPM | BODY MASS INDEX: 23.95 KG/M2 | SYSTOLIC BLOOD PRESSURE: 130 MMHG | DIASTOLIC BLOOD PRESSURE: 88 MMHG | HEIGHT: 60 IN | WEIGHT: 122 LBS

## 2020-07-15 DIAGNOSIS — Z11.59 NEED FOR HEPATITIS C SCREENING TEST: ICD-10-CM

## 2020-07-15 DIAGNOSIS — M25.511 RIGHT SHOULDER PAIN, UNSPECIFIED CHRONICITY: ICD-10-CM

## 2020-07-15 DIAGNOSIS — R73.03 PREDIABETES: ICD-10-CM

## 2020-07-15 DIAGNOSIS — M54.2 CHRONIC NECK PAIN: ICD-10-CM

## 2020-07-15 DIAGNOSIS — M47.812 CERVICAL SPONDYLOSIS: ICD-10-CM

## 2020-07-15 DIAGNOSIS — N18.1 STAGE 1 CHRONIC KIDNEY DISEASE: ICD-10-CM

## 2020-07-15 DIAGNOSIS — I15.9 SECONDARY HYPERTENSION: ICD-10-CM

## 2020-07-15 DIAGNOSIS — G89.29 CHRONIC NECK PAIN: ICD-10-CM

## 2020-07-15 DIAGNOSIS — M75.42 IMPINGEMENT SYNDROME OF SHOULDER REGION, LEFT: ICD-10-CM

## 2020-07-15 DIAGNOSIS — E78.5 HYPERLIPIDEMIA, UNSPECIFIED HYPERLIPIDEMIA TYPE: ICD-10-CM

## 2020-07-15 DIAGNOSIS — E78.5 DYSLIPIDEMIA: ICD-10-CM

## 2020-07-15 PROCEDURE — 99214 OFFICE O/P EST MOD 30 MIN: CPT | Mod: 95,CR | Performed by: FAMILY MEDICINE

## 2020-07-15 RX ORDER — CARISOPRODOL 350 MG/1
350 TABLET ORAL 4 TIMES DAILY
COMMUNITY
End: 2022-05-19 | Stop reason: SDUPTHER

## 2020-07-15 ASSESSMENT — FIBROSIS 4 INDEX: FIB4 SCORE: 1.06

## 2020-07-15 NOTE — PROGRESS NOTES
Telemedicine Visit: Established Patient     This encounter was conducted via [employer].   Verbal consent was obtained. Patient's identity was verified.    Subjective:   CC: regular follow up prediabetes htn ckd and dyslipidemia   Marilou Arias Reyes is a 59 y.o. female presenting for evaluation and management of:    She feels well in her usual state of health   Ongoing neck shoulder pain chronic     ROS   Denies any recent fevers or chills. No nausea or vomiting. No chest pains or shortness of breath.     No Known Allergies    Current medicines (including changes today)  Current Outpatient Medications   Medication Sig Dispense Refill   • carisoprodol (SOMA) 350 MG Tab Take 350 mg by mouth 4 times a day.     • Diclofenac Sodium (VOLTAREN) 1 % Gel Apply 4 g to skin as directed 2 Times a Day. 100 g 11   • Diclofenac Sodium 1 % Gel APPLY 1 GRAM EXTERNALLY TO SKIN  OF AFFECTED AREA(S) FOUR TIMES DAILY 100 g 0   • cyclobenzaprine (FLEXERIL) 10 MG Tab TAKE ONE TABLET BY MOUTH THREE TIMES DAILY AS NEEDED 90 Tab 0   • losartan (COZAAR) 50 MG Tab TAKE ONE TABLET BY MOUTH ONE TIME DAILY 90 Tab 2   • ascorbic acid (ASCORBIC ACID) 500 MG Tab Take 500 mg by mouth every day.     • ibuprofen (MOTRIN) 800 MG Tab Take 1 Tab by mouth every 8 hours as needed for Mild Pain. 90 Tab 3   • omeprazole (PRILOSEC) 20 MG delayed-release capsule TAKE ONE CAPSULE BY MOUTH ONE TIME DAILY 90 Cap 3   • albuterol 108 (90 Base) MCG/ACT Aero Soln inhalation aerosol Inhale 2 Puffs by mouth every 6 hours as needed for Shortness of Breath. 8.5 g 11   • fluticasone (FLONASE) 50 MCG/ACT nasal spray Spray 1 Spray in nose every day. 2 Bottle 11   • Cholecalciferol (VITAMIN D PO) Take 2,000 mg by mouth every day.     • Calcium 1500 MG Tab Take 1 Tab by mouth every day.     • Glucosamine HCl (GLUCOSAMINE PO) Take 1 Tab by mouth every day.     • Omega-3 Fatty Acids (FISH OIL) 1000 MG Cap capsule Take 1,000 mg by mouth every day.     • Multiple Vitamins-Minerals  "(MULTIVITAMIN PO) Take 1 Tab by mouth every day.     • azithromycin (ZITHROMAX) 250 MG Tab Take 2 tabs on day 1, then 1 tab on day 2-5 (Patient not taking: Reported on 7/15/2020) 6 Tab 0   • methylPREDNISolone (MEDROL DOSEPAK) 4 MG Tablet Therapy Pack As directed on the packaging label. (Patient not taking: Reported on 4/1/2020) 21 Tab 0     No current facility-administered medications for this visit.        Patient Active Problem List    Diagnosis Date Noted   • Right shoulder pain 07/15/2020   • Impingement syndrome of shoulder region, left 07/15/2020   • Cervical spondylosis 07/15/2020   • Dyslipidemia 07/15/2020   • Stage 1 chronic kidney disease 07/15/2020   • Reactive airway disease without complication 06/20/2019   • Chronic left shoulder pain 05/26/2017   • Prediabetes 04/01/2016   • Environmental allergies 01/28/2016   • Hypertension 01/28/2016   • Hyperlipidemia 01/28/2016   • Chronic neck pain 01/28/2016   • Vitamin D insufficiency 01/28/2016   • Depression 11/01/2013       Family History   Problem Relation Age of Onset   • Lung Disease Mother         COPD   • Diabetes Mother    • Hypertension Mother    • Heart Disease Father    • Hypertension Father    • Cancer Sister         Breast   • Cancer Brother         Colon, prostate   • Alcohol/Drug Neg Hx    • Stroke Neg Hx        She  has a past medical history of Arthritis, Gestational diabetes, Hypertension, and Prolactinoma (HCC).  She  has a past surgical history that includes primary c section and cholecystectomy.       Objective:   /88 (BP Location: Left arm, Patient Position: Sitting, BP Cuff Size: Adult) Comment: pt stated  Pulse 99 Comment: pt stated  Ht 1.518 m (4' 11.75\") Comment: pt stated  Wt 55.3 kg (122 lb) Comment: pt stated  BMI 24.03 kg/m²     Physical Exam:  Constitutional: Alert, no distress, well-groomed.  Skin: No rashes in visible areas.  Eye: Round. Conjunctiva clear, lids normal. No icterus.   ENMT: Lips pink without lesions, " good dentition, moist mucous membranes. Phonation normal.  Neck: No masses, no thyromegaly. Moves freely without pain.  CV: Pulse as reported by patient  Respiratory: Unlabored respiratory effort, no cough or audible wheeze  Psych: Alert and oriented x3, normal affect and mood.     Reviewed labs     Assessment and Plan:   The following treatment plan was discussed:     1. Prediabetes  - Lipid Profile; Future  - MICROALBUMIN CREAT RATIO URINE; Future  - Basic Metabolic Panel; Future  - HEMOGLOBIN A1C; Future    2. Stage 1 chronic kidney disease  - Lipid Profile; Future  - MICROALBUMIN CREAT RATIO URINE; Future  - Basic Metabolic Panel; Future  - HEMOGLOBIN A1C; Future    3. Dyslipidemia  - Lipid Profile; Future  - MICROALBUMIN CREAT RATIO URINE; Future  - Basic Metabolic Panel; Future  - HEMOGLOBIN A1C; Future    4. Cervical spondylosis  - REFERRAL TO PHYSICAL THERAPY Reason for Therapy: Eval/Treat/Report  - DX-CERVICAL SPINE-2 OR 3 VIEWS; Future  - DX-SHOULDER 2+ LEFT; Future  - DX-SHOULDER 2+ RIGHT; Future    5. Impingement syndrome of shoulder region, left  - REFERRAL TO PHYSICAL THERAPY Reason for Therapy: Eval/Treat/Report  - DX-CERVICAL SPINE-2 OR 3 VIEWS; Future  - DX-SHOULDER 2+ LEFT; Future  - DX-SHOULDER 2+ RIGHT; Future  - Diclofenac Sodium (VOLTAREN) 1 % Gel; Apply 4 g to skin as directed 2 Times a Day.  Dispense: 100 g; Refill: 11    6. Right shoulder pain, unspecified chronicity  - REFERRAL TO PHYSICAL THERAPY Reason for Therapy: Eval/Treat/Report  - DX-CERVICAL SPINE-2 OR 3 VIEWS; Future  - DX-SHOULDER 2+ LEFT; Future  - DX-SHOULDER 2+ RIGHT; Future  - Diclofenac Sodium (VOLTAREN) 1 % Gel; Apply 4 g to skin as directed 2 Times a Day.  Dispense: 100 g; Refill: 11    7. Secondary hypertension    8. Hyperlipidemia, unspecified hyperlipidemia type    9. Need for hepatitis C screening test  - HEP C VIRUS ANTIBODY; Future    10. Chronic neck pain    Other orders  - carisoprodol (SOMA) 350 MG Tab; Take 350 mg  by mouth 4 times a day.      Problem List Items Addressed This Visit     Hypertension     Moderately well controlled on arb   She is taking a lot of NSAID   Counseled to reduce this     Follow up q6 mo with labs              Hyperlipidemia     Not statin candidate   Will monitor q6 mo              Chronic neck pain     She is asking for x rays of shoulders and neck   She saw [facility] 2 years ago was offered steroid injection shoulder acupuncture   I have offered her physiatry referral   She is open to PT but unfortunately not open to follow up with [facility] or consultation with physiatry              Relevant Medications    carisoprodol (SOMA) 350 MG Tab    Prediabetes     a1c well controlled   No meds   Plan for labs and clinic visits q6 mo virtual ok as they have blood pressure cuff at home              Relevant Orders    Lipid Profile    MICROALBUMIN CREAT RATIO URINE    Basic Metabolic Panel    HEMOGLOBIN A1C    Right shoulder pain    Relevant Medications    Diclofenac Sodium (VOLTAREN) 1 % Gel    Other Relevant Orders    REFERRAL TO PHYSICAL THERAPY Reason for Therapy: Eval/Treat/Report    DX-CERVICAL SPINE-2 OR 3 VIEWS    DX-SHOULDER 2+ LEFT    DX-SHOULDER 2+ RIGHT    Impingement syndrome of shoulder region, left    Relevant Medications    Diclofenac Sodium (VOLTAREN) 1 % Gel    Other Relevant Orders    REFERRAL TO PHYSICAL THERAPY Reason for Therapy: Eval/Treat/Report    DX-CERVICAL SPINE-2 OR 3 VIEWS    DX-SHOULDER 2+ LEFT    DX-SHOULDER 2+ RIGHT    Cervical spondylosis    Relevant Medications    carisoprodol (SOMA) 350 MG Tab    Other Relevant Orders    REFERRAL TO PHYSICAL THERAPY Reason for Therapy: Eval/Treat/Report    DX-CERVICAL SPINE-2 OR 3 VIEWS    DX-SHOULDER 2+ LEFT    DX-SHOULDER 2+ RIGHT    Dyslipidemia    Relevant Orders    Lipid Profile    MICROALBUMIN CREAT RATIO URINE    Basic Metabolic Panel    HEMOGLOBIN A1C    Stage 1 chronic kidney disease     Cautioned to avoid NSAIDs as much as possible   I am no  longer comfortable offering rx for NSAIDS as she has renal impairment and her  has poorly controlled HTN   Counseled to try tylenol first and NSAID only for breakthrough       Will monitor q6 mo                Relevant Orders    Lipid Profile    MICROALBUMIN CREAT RATIO URINE    Basic Metabolic Panel    HEMOGLOBIN A1C      Other Visit Diagnoses     Need for hepatitis C screening test        Relevant Orders    HEP C VIRUS ANTIBODY        Geno Benitez M.D.    Follow-up: No follow-ups on file.

## 2020-07-15 NOTE — ASSESSMENT & PLAN NOTE
She is asking for x rays of shoulders and neck   She saw [facility] 2 years ago was offered steroid injection shoulder acupuncture   I have offered her physiatry referral   She is open to PT but unfortunately not open to follow up with [facility] or consultation with physiatry

## 2020-07-15 NOTE — ASSESSMENT & PLAN NOTE
Cautioned to avoid NSAIDs as much as possible   I am no longer comfortable offering rx for NSAIDS as she has renal impairment and her  has poorly controlled HTN   Counseled to try tylenol first and NSAID only for breakthrough       Will monitor q6 mo

## 2020-07-15 NOTE — ASSESSMENT & PLAN NOTE
a1c well controlled   No meds   Plan for labs and clinic visits q6 mo virtual ok as they have blood pressure cuff at home

## 2020-07-15 NOTE — ASSESSMENT & PLAN NOTE
Moderately well controlled on arb   She is taking a lot of NSAID   Counseled to reduce this     Follow up q6 mo with labs

## 2020-07-23 ENCOUNTER — TELEMEDICINE (OUTPATIENT)
Dept: MEDICAL GROUP | Facility: MEDICAL CENTER | Age: 60
End: 2020-07-23
Payer: COMMERCIAL

## 2020-07-23 VITALS — WEIGHT: 122 LBS | BODY MASS INDEX: 23.95 KG/M2 | HEART RATE: 100 BPM | HEIGHT: 60 IN

## 2020-07-23 DIAGNOSIS — N18.1 STAGE 1 CHRONIC KIDNEY DISEASE: ICD-10-CM

## 2020-07-23 PROCEDURE — 99213 OFFICE O/P EST LOW 20 MIN: CPT | Mod: 95,CR | Performed by: FAMILY MEDICINE

## 2020-07-23 ASSESSMENT — FIBROSIS 4 INDEX: FIB4 SCORE: 1.06

## 2020-07-23 NOTE — ASSESSMENT & PLAN NOTE
I explain that this is based upon increased microalbumin to creatinine ratio  This is also not new for her and has been stable for years   Off an on     This is main reason why last visit we focused on reducing/eliminating NSAID oral   We have offered PT topical NSAID PT tylenol as alternative     15+ min face to face >50% spent discussing plan /coordinating care

## 2020-07-23 NOTE — PROGRESS NOTES
Telemedicine Visit: Established Patient     This encounter was conducted via [employer].   Verbal consent was obtained. Patient's identity was verified.    Subjective:   CC: noted CKD stage one on mychart, very concerned requested time to talk about it   Marilou Arias Reyes is a 59 y.o. female presenting for evaluation and management of:    Chronic medical conditions include HTN prediabetes hyperlimidemia chronic nsaid use ercently       ROS   Denies any recent fevers or chills. No nausea or vomiting. No chest pains or shortness of breath.     No Known Allergies    Current medicines (including changes today)  Current Outpatient Medications   Medication Sig Dispense Refill   • carisoprodol (SOMA) 350 MG Tab Take 350 mg by mouth 4 times a day.     • Diclofenac Sodium (VOLTAREN) 1 % Gel Apply 4 g to skin as directed 2 Times a Day. 100 g 11   • Diclofenac Sodium 1 % Gel APPLY 1 GRAM EXTERNALLY TO SKIN  OF AFFECTED AREA(S) FOUR TIMES DAILY 100 g 0   • cyclobenzaprine (FLEXERIL) 10 MG Tab TAKE ONE TABLET BY MOUTH THREE TIMES DAILY AS NEEDED 90 Tab 0   • losartan (COZAAR) 50 MG Tab TAKE ONE TABLET BY MOUTH ONE TIME DAILY 90 Tab 2   • ascorbic acid (ASCORBIC ACID) 500 MG Tab Take 500 mg by mouth every day.     • ibuprofen (MOTRIN) 800 MG Tab Take 1 Tab by mouth every 8 hours as needed for Mild Pain. 90 Tab 3   • omeprazole (PRILOSEC) 20 MG delayed-release capsule TAKE ONE CAPSULE BY MOUTH ONE TIME DAILY 90 Cap 3   • albuterol 108 (90 Base) MCG/ACT Aero Soln inhalation aerosol Inhale 2 Puffs by mouth every 6 hours as needed for Shortness of Breath. 8.5 g 11   • fluticasone (FLONASE) 50 MCG/ACT nasal spray Spray 1 Spray in nose every day. 2 Bottle 11   • Cholecalciferol (VITAMIN D PO) Take 2,000 mg by mouth every day.     • Calcium 1500 MG Tab Take 1 Tab by mouth every day.     • Glucosamine HCl (GLUCOSAMINE PO) Take 1 Tab by mouth every day.     • Omega-3 Fatty Acids (FISH OIL) 1000 MG Cap capsule Take 1,000 mg by mouth every  "day.     • Multiple Vitamins-Minerals (MULTIVITAMIN PO) Take 1 Tab by mouth every day.     • azithromycin (ZITHROMAX) 250 MG Tab Take 2 tabs on day 1, then 1 tab on day 2-5 (Patient not taking: Reported on 7/15/2020) 6 Tab 0   • methylPREDNISolone (MEDROL DOSEPAK) 4 MG Tablet Therapy Pack As directed on the packaging label. (Patient not taking: Reported on 4/1/2020) 21 Tab 0     No current facility-administered medications for this visit.        Patient Active Problem List    Diagnosis Date Noted   • Right shoulder pain 07/15/2020   • Impingement syndrome of shoulder region, left 07/15/2020   • Cervical spondylosis 07/15/2020   • Dyslipidemia 07/15/2020   • Stage 1 chronic kidney disease 07/15/2020   • Reactive airway disease without complication 06/20/2019   • Chronic left shoulder pain 05/26/2017   • Prediabetes 04/01/2016   • Environmental allergies 01/28/2016   • Hypertension 01/28/2016   • Hyperlipidemia 01/28/2016   • Chronic neck pain 01/28/2016   • Vitamin D insufficiency 01/28/2016   • Depression 11/01/2013       Family History   Problem Relation Age of Onset   • Lung Disease Mother         COPD   • Diabetes Mother    • Hypertension Mother    • Heart Disease Father    • Hypertension Father    • Cancer Sister         Breast   • Cancer Brother         Colon, prostate   • Alcohol/Drug Neg Hx    • Stroke Neg Hx        She  has a past medical history of Arthritis, Gestational diabetes, Hypertension, and Prolactinoma (HCC).  She  has a past surgical history that includes primary c section and cholecystectomy.       Objective:   Pulse 100 Comment: pt stated  Ht 1.518 m (4' 11.75\") Comment: pt stated  Wt 55.3 kg (122 lb) Comment: pt stated  BMI 24.03 kg/m²     Physical Exam:  Constitutional: Alert, no distress, well-groomed.  Skin: No rashes in visible areas.  Eye: Round. Conjunctiva clear, lids normal. No icterus.   ENMT: Lips pink without lesions, good dentition, moist mucous membranes. Phonation " normal.  Neck: No masses, no thyromegaly. Moves freely without pain.  CV: Pulse as reported by patient  Respiratory: Unlabored respiratory effort, no cough or audible wheeze  Psych: Alert and oriented x3, normal affect and mood.       Assessment and Plan:   The following treatment plan was discussed:     1. Stage 1 chronic kidney disease    Problem List Items Addressed This Visit     Stage 1 chronic kidney disease     I explain that this is based upon increased microalbumin to creatinine ratio  This is also not new for her and has been stable for years   Off an on     This is main reason why last visit we focused on reducing/eliminating NSAID oral   We have offered PT topical NSAID PT tylenol as alternative     15+ min face to face >50% spent discussing plan /coordinating care                Geno Benitez M.D.      Follow-up: No follow-ups on file.

## 2020-08-03 ENCOUNTER — PHYSICAL THERAPY (OUTPATIENT)
Dept: PHYSICAL THERAPY | Facility: REHABILITATION | Age: 60
End: 2020-08-03
Attending: FAMILY MEDICINE
Payer: COMMERCIAL

## 2020-08-03 DIAGNOSIS — M25.511 RIGHT SHOULDER PAIN, UNSPECIFIED CHRONICITY: ICD-10-CM

## 2020-08-03 DIAGNOSIS — M47.812 CERVICAL SPONDYLOSIS: ICD-10-CM

## 2020-08-03 DIAGNOSIS — M75.42 IMPINGEMENT SYNDROME OF SHOULDER REGION, LEFT: ICD-10-CM

## 2020-08-03 PROCEDURE — 97014 ELECTRIC STIMULATION THERAPY: CPT

## 2020-08-03 PROCEDURE — 97140 MANUAL THERAPY 1/> REGIONS: CPT

## 2020-08-03 PROCEDURE — 97535 SELF CARE MNGMENT TRAINING: CPT

## 2020-08-03 PROCEDURE — 97162 PT EVAL MOD COMPLEX 30 MIN: CPT

## 2020-08-03 ASSESSMENT — ENCOUNTER SYMPTOMS
PAIN SCALE AT HIGHEST: 5
PAIN SCALE AT LOWEST: 2
PAIN SCALE: 2

## 2020-08-07 ENCOUNTER — PHYSICAL THERAPY (OUTPATIENT)
Dept: PHYSICAL THERAPY | Facility: REHABILITATION | Age: 60
End: 2020-08-07
Attending: FAMILY MEDICINE
Payer: COMMERCIAL

## 2020-08-07 DIAGNOSIS — M47.812 CERVICAL SPONDYLOSIS: ICD-10-CM

## 2020-08-07 DIAGNOSIS — M75.02 ADHESIVE CAPSULITIS OF LEFT SHOULDER: ICD-10-CM

## 2020-08-07 DIAGNOSIS — M25.512 ACUTE PAIN OF LEFT SHOULDER: ICD-10-CM

## 2020-08-07 PROCEDURE — 97140 MANUAL THERAPY 1/> REGIONS: CPT

## 2020-08-07 PROCEDURE — 97014 ELECTRIC STIMULATION THERAPY: CPT

## 2020-08-07 PROCEDURE — 97110 THERAPEUTIC EXERCISES: CPT

## 2020-08-07 NOTE — OP THERAPY DAILY TREATMENT
Outpatient Physical Therapy  DAILY TREATMENT     [facility] Outpatient Physical Therapy [place] [id number] [facility], [address]  [place] [address]  Phone:  [phone]    Date: 08/07/2020    Patient: Marilou Arias Reyes  YOB: 1960  MRN: [id number]     Time Calculation    Start time: 0930  Stop time: 1015 Time Calculation (min): 45 minutes         Chief Complaint: No chief complaint on file.    Visit #: 2    SUBJECTIVE  Feeling sore in L. Shoulder and it hurts when I raise or use my L. Arm. Decompressive stretching in my neck helped reduce pain in my arm.     OBJECTIVE:  Current objective measures:   0 deg A/PROM sh. Ext rotation on L. Firm capsular end feel with strong amount of pain in L. Arm.             Therapeutic Exercises (CPT 69846):     1. AAROM table slide flexion     2. Short arm flexion pain free ROM    3. Tband adduction     4. Prone arm off table extension     Therapeutic Treatments and Modalities:     1. Manual Therapy (CPT 31868), C/s, traction and stw for tonic inhibition; scapular deperession w/ L. cervical upglide into RR    2. Manual Therapy (CPT 98564), GHJ, GR II mob lateral and inferior     3. E Stim Unattended (CPT 74445), cervicoscapular , MHP and IFC 15 min duration     Time-based treatments/modalities:    Physical Therapy Timed Treatment Charges  Manual therapy minutes (CPT 20171): 20 minutes  Therapeutic exercise minutes (CPT 90251): 15 minutes        ASSESSMENT:   Response to treatment: Patient forming painful capsular restriction in glenohumeral joint, follows capsular pattern with loss of external rotation > Abduction > flexion. Conscering for early stages of adhesive capsulitis (AC). Patient has signs of cervical component to arm pain as well but it's important to get shoulder moving to prevent further progression of AC. Focus of session was gentle capsular mobilization to GHJ with shoulder AROM within pain free range of motion.     PLAN/RECOMMENDATIONS:   Plan for treatment:  therapy treatment to continue next visit.  Planned interventions for next visit: continue with current treatment.

## 2020-08-11 ENCOUNTER — PHYSICAL THERAPY (OUTPATIENT)
Dept: PHYSICAL THERAPY | Facility: REHABILITATION | Age: 60
End: 2020-08-11
Attending: FAMILY MEDICINE
Payer: COMMERCIAL

## 2020-08-11 DIAGNOSIS — M47.812 CERVICAL SPONDYLOSIS: ICD-10-CM

## 2020-08-11 DIAGNOSIS — M25.512 ACUTE PAIN OF LEFT SHOULDER: ICD-10-CM

## 2020-08-11 DIAGNOSIS — M75.42 IMPINGEMENT SYNDROME OF SHOULDER REGION, LEFT: ICD-10-CM

## 2020-08-11 PROCEDURE — 97110 THERAPEUTIC EXERCISES: CPT

## 2020-08-11 PROCEDURE — 97140 MANUAL THERAPY 1/> REGIONS: CPT

## 2020-08-11 NOTE — OP THERAPY DAILY TREATMENT
Outpatient Physical Therapy  DAILY TREATMENT     [facility] Outpatient Physical Therapy [place] [address] [facility], [address]  [place] [address]  Phone:  [phone]    Date: 08/11/2020    Patient: Marilou Arias Reyes  YOB: 1960  MRN: [id number]     Time Calculation    Start time: 0200  Stop time: 0230 Time Calculation (min): 30 minutes         Chief Complaint: No chief complaint on file.    Visit #: 3    SUBJECTIVE  Feeling sore in L. Shoulder and it hurts when I raise or use my L. Arm. Decompressive stretching in my neck helped reduce pain in my arm.     OBJECTIVE:  Current objective measures:   0 deg A/PROM sh. Ext rotation on L. Firm capsular end feel with strong amount of pain in L. Arm.             Therapeutic Exercises (CPT 45834):     1. AAROM table slide flexion     2. Short arm flexion pain free ROM, pain without gain in mobility, D/C'd 8/11    3. Tband adduction     4. Prone arm off table extension     5. Short arm abduction in standing    Therapeutic Treatments and Modalities:     2. Manual Therapy (CPT 13660), GHJ, GR II mob lateral and inferior, PROM    Time-based treatments/modalities:    Physical Therapy Timed Treatment Charges  Manual therapy minutes (CPT 66772): 15 minutes  Therapeutic exercise minutes (CPT 39565): 15 minutes        ASSESSMENT:   Improving GHJ mobility but patient continues to demonstrate pain with arm elevation without increase in AROM before pain comes on. No pain resisted IR, ER, abd at 0 deg. Of elevation. Signs and symptoms consistent with glenohumeral and ACJ arthropathy. Trial of taping for GHJ support to see if supporting RC tissue will help with shoulder  Mobility gains.     PLAN/RECOMMENDATIONS:   Plan for treatment: therapy treatment to continue next visit.  Planned interventions for next visit: continue with current treatment.

## 2020-08-13 ENCOUNTER — PHYSICAL THERAPY (OUTPATIENT)
Dept: PHYSICAL THERAPY | Facility: REHABILITATION | Age: 60
End: 2020-08-13
Attending: FAMILY MEDICINE
Payer: COMMERCIAL

## 2020-08-13 DIAGNOSIS — M25.511 RIGHT SHOULDER PAIN, UNSPECIFIED CHRONICITY: ICD-10-CM

## 2020-08-13 DIAGNOSIS — M25.512 ACUTE PAIN OF LEFT SHOULDER: ICD-10-CM

## 2020-08-13 DIAGNOSIS — M75.42 IMPINGEMENT SYNDROME OF SHOULDER REGION, LEFT: ICD-10-CM

## 2020-08-13 DIAGNOSIS — M47.812 CERVICAL SPONDYLOSIS: ICD-10-CM

## 2020-08-13 PROCEDURE — 97110 THERAPEUTIC EXERCISES: CPT

## 2020-08-13 PROCEDURE — 97140 MANUAL THERAPY 1/> REGIONS: CPT

## 2020-08-13 NOTE — OP THERAPY DAILY TREATMENT
Outpatient Physical Therapy  DAILY TREATMENT     [facility] Outpatient Physical Therapy [place] [address] [facility], [address]  [place] [address]  Phone:  [phone]    Date: 08/13/2020    Patient: Marilou Arias Reyes  YOB: 1960  MRN: [id number]     Time Calculation    Start time: 0850  Stop time: 0930 Time Calculation (min): 40 minutes         Chief Complaint: No chief complaint on file.    Visit #: 4    SUBJECTIVE:  Shoulder is not too sore today.     OBJECTIVE:  Current objective measures:  Poor tolerance for scapular mobs inferior/superior. Muscle guarding for superior glide     Taping GHJ for RC support was not significantly helpful in improving shoulder AROM within session     Capsular stiffness of GHJ posterior, stiff and painful     Hypertonicity of thoracic paraspinals that patient reporting is painful to palpation, despite what is perceived as reduction in tone.    Patient reports pain with AROM despite motion being faster and less effortful. Quantity of motion had not improved.           Therapeutic Exercises (CPT 67711):     1. Prone I's    2. Scap retraction/protraction in SL arm supported    3. Scap sup/inf AROM    Therapeutic Treatments and Modalities:     1. Manual Therapy (CPT 10875), shoulder, taping for RC support    2. Manual Therapy (CPT 03766), Shoulder, scap mobs AAROM    3. Manual Therapy (CPT 94259), thoracic, prone PA gr II and stw with paraspinal mm fiber direction     Time-based treatments/modalities:    Physical Therapy Timed Treatment Charges  Manual therapy minutes (CPT 40668): 20 minutes  Therapeutic exercise minutes (CPT 74088): 20 minutes      Pain rating (1-10) before treatment:  2  Pain rating (1-10) after treatment:  5    ASSESSMENT:   Response to treatment: Patient reported that her shoulder felt worse at the end of session. At this point treatments to reduce pain coming from RC have been ineffective, cervical decompression has not improved shoulder AROM, and GHJ  mobilization improved mobility but did not reduce pain with movement.     PLAN/RECOMMENDATIONS:   Plan for treatment: therapy treatment to continue next visit.  Planned interventions for next visit: continue with current treatment.

## 2020-08-17 ENCOUNTER — PHYSICAL THERAPY (OUTPATIENT)
Dept: PHYSICAL THERAPY | Facility: REHABILITATION | Age: 60
End: 2020-08-17
Attending: FAMILY MEDICINE
Payer: COMMERCIAL

## 2020-08-17 DIAGNOSIS — M47.812 CERVICAL SPONDYLOSIS: ICD-10-CM

## 2020-08-17 PROCEDURE — 97110 THERAPEUTIC EXERCISES: CPT

## 2020-08-17 PROCEDURE — 97140 MANUAL THERAPY 1/> REGIONS: CPT

## 2020-08-17 NOTE — OP THERAPY DAILY TREATMENT
Outpatient Physical Therapy  DAILY TREATMENT     [facility] Outpatient Physical Therapy [address] [facility], [address]  [place] [address]  Phone:  [phone]    Date: 08/17/2020    Patient: Marilou Arias Reyes  YOB: 1960  MRN: [id number]     Time Calculation    Start time: 0200  Stop time: 0240 Time Calculation (min): 40 minutes         Chief Complaint: No chief complaint on file.    Visit #: 5    SUBJECTIVE:  Shoulder felt better with improved sh. AROM with less pain the day of PT. The next day though symptoms started to return despite still having tape on.      OBJECTIVE:  Current objective measures:     10 deg of active and passive GHJ ext rotation with pain posterior sh. STW for tonic inhibition of posterior delt, and infrasp. And teres min. Improved GHJ mobility to 45 deg. Within session.           Therapeutic Exercises (CPT 02590):     1. Shoulder AROM within pain free ROM    Therapeutic Treatments and Modalities:     1. Manual Therapy (CPT 80344), shoulder, taping for RC support    2. Manual Therapy (CPT 60869), Shoulder, stw tonic inhibition of external rotators and posterior sh. GHJ lat dist gr II and inverior Gr III     3. Manual Therapy (CPT 86531), cervical , manual traction     Time-based treatments/modalities:    Physical Therapy Timed Treatment Charges  Manual therapy minutes (CPT 75811): 30 minutes  Therapeutic exercise minutes (CPT 30260): 10 minutes      ASSESSMENT:   Response to treatment: Taping is reducing pain with shoulder AROM. Reducing tension and irritation of posterior GHJ soft tissue was helpful in improving GHJ mobility for external rotation.     PLAN/RECOMMENDATIONS:   Plan for treatment: therapy treatment to continue next visit.  Planned interventions for next visit: continue with current treatment.

## 2020-08-21 ENCOUNTER — PHYSICAL THERAPY (OUTPATIENT)
Dept: PHYSICAL THERAPY | Facility: REHABILITATION | Age: 60
End: 2020-08-21
Attending: FAMILY MEDICINE
Payer: COMMERCIAL

## 2020-08-21 DIAGNOSIS — M75.42 IMPINGEMENT SYNDROME OF SHOULDER REGION, LEFT: ICD-10-CM

## 2020-08-21 DIAGNOSIS — M25.512 ACUTE PAIN OF LEFT SHOULDER: ICD-10-CM

## 2020-08-21 DIAGNOSIS — M47.812 CERVICAL SPONDYLOSIS: ICD-10-CM

## 2020-08-21 PROCEDURE — 97110 THERAPEUTIC EXERCISES: CPT

## 2020-08-21 NOTE — OP THERAPY PROGRESS SUMMARY
Outpatient Physical Therapy  PROGRESS SUMMARY NOTE      [facility] [place]  [address], [address]  [place] [address]  Phone:  [phone]    Date of Visit: 08/21/2020    Patient: Marilou Arias Reyes  YOB: 1960  MRN: [id number]     Referring Provider: Geno Benitez M.D.  [address] [facility]  [address]  [place] [address]   Referring Diagnosis Spondylosis without myelopathy or radiculopathy, cervical region [M47.812];Impingement syndrome of left shoulder [M75.42]     Visit Diagnoses     ICD-10-CM   1. Cervical spondylosis  M47.812   2. Acute pain of left shoulder  M25.512   3. Impingement syndrome of shoulder region, left  M75.42       Rehab Potential: poor    Progress Report Period: 8/3/2020 to 8/21/2020       Functional Assessment Used          Objective Findings and Assessment:   Patient progression towards goals: Patient attending PT with chief complaint of L. Proximal humerus pain. She came in with referring Dx's of cervical radiculopathy and L. Shoulder pain. She has positivity findings for cervical radic. But by and large her chief complaint of shoulder pain is coming from the shoulder and not a referral from the neck. Unfortunately, she has not made much improvement with PT after attending 6 visits. Patient wants to follow up with referring MD to discuss further diagnostic imaging and treatment.     Objective findings and assessment details: Cervical    C/s rotation  75% of normal and pain w/ head turns L. In upper trap. NOT a ROS of primary pain complaint  C/s extension 75% of normal and reproduces L. Upper trap pain  Rain's (+) for upper trap pain but NOT L. Arm pain      Shoulder AROM  Flexion 70 deg and strong ROS of L. Arm pain  45 deg of ext rotation with pain at proximal humerus   BTB internal rotation: sacrum     PROM  Same as AROM in quantity of motion. Pain  Abduction: 50 deg and strong Reproduction of symptoms      Goals:   Short Term Goals:   None        Long Term Goals:    None       Referring provider co-signature:  I have reviewed this plan of care and my co-signature certifies the need for services.     Certification Period: 08/21/2020 to 09/11/20    Physician Signature: ________________________________ Date: ______________

## 2020-08-21 NOTE — OP THERAPY DAILY TREATMENT
Outpatient Physical Therapy  DAILY TREATMENT     [facility] Outpatient Physical Therapy [address], [address]  [place] [address]  Phone:  [phone]    Date: 08/21/2020    Patient: Marilou Arias Reyes  YOB: 1960  MRN: [id number]     Time Calculation    Start time: 1000  Stop time: 1030 Time Calculation (min): 30 minutes         Chief Complaint: No chief complaint on file.    Visit #: 6    SUBJECTIVE:  I don't feel like I am making any improvement     OBJECTIVE:  Current objective measures:   See progress report         Exercises/Treatment  Time-based treatments/modalities:    Physical Therapy Timed Treatment Charges  Therapeutic exercise minutes (CPT 50224): 30 minutes        ASSESSMENT:   Response to treatment: Patient has not made much subjective or objective progress despite several treatment approaches. Her chief complaint of L. Shoulder pain remains unchanged. Patient wants to follow up with MD to discuss other options and testing.     PLAN/RECOMMENDATIONS:   Plan for treatment:PT hold with possible discharge

## 2020-10-21 ENCOUNTER — HOSPITAL ENCOUNTER (OUTPATIENT)
Dept: RADIOLOGY | Facility: MEDICAL CENTER | Age: 60
End: 2020-10-21
Attending: FAMILY MEDICINE
Payer: COMMERCIAL

## 2020-10-21 ENCOUNTER — HOSPITAL ENCOUNTER (OUTPATIENT)
Dept: RADIOLOGY | Facility: MEDICAL CENTER | Age: 60
End: 2020-10-21
Attending: OBSTETRICS & GYNECOLOGY
Payer: COMMERCIAL

## 2020-10-21 DIAGNOSIS — M47.812 CERVICAL SPONDYLOSIS: ICD-10-CM

## 2020-10-21 DIAGNOSIS — M25.511 RIGHT SHOULDER PAIN, UNSPECIFIED CHRONICITY: ICD-10-CM

## 2020-10-21 DIAGNOSIS — M75.42 IMPINGEMENT SYNDROME OF SHOULDER REGION, LEFT: ICD-10-CM

## 2020-10-21 DIAGNOSIS — Z12.31 ENCOUNTER FOR SCREENING MAMMOGRAM FOR BREAST CANCER: ICD-10-CM

## 2020-10-21 PROCEDURE — 72040 X-RAY EXAM NECK SPINE 2-3 VW: CPT

## 2020-10-21 PROCEDURE — 76641 ULTRASOUND BREAST COMPLETE: CPT

## 2020-10-21 PROCEDURE — 77067 SCR MAMMO BI INCL CAD: CPT

## 2020-10-21 PROCEDURE — 73030 X-RAY EXAM OF SHOULDER: CPT | Mod: LT

## 2020-10-21 PROCEDURE — 73030 X-RAY EXAM OF SHOULDER: CPT | Mod: RT

## 2020-12-04 DIAGNOSIS — M54.9 BACK PAIN, UNSPECIFIED BACK LOCATION, UNSPECIFIED BACK PAIN LATERALITY, UNSPECIFIED CHRONICITY: ICD-10-CM

## 2020-12-04 DIAGNOSIS — M25.511 CHRONIC PAIN OF BOTH SHOULDERS: ICD-10-CM

## 2020-12-04 DIAGNOSIS — M25.512 CHRONIC PAIN OF BOTH SHOULDERS: ICD-10-CM

## 2020-12-04 DIAGNOSIS — G89.29 CHRONIC PAIN OF BOTH SHOULDERS: ICD-10-CM

## 2020-12-08 RX ORDER — OMEPRAZOLE 20 MG/1
CAPSULE, DELAYED RELEASE ORAL
Qty: 90 CAP | Refills: 2 | Status: SHIPPED | OUTPATIENT
Start: 2020-12-08 | End: 2022-08-11

## 2020-12-08 RX ORDER — CYCLOBENZAPRINE HCL 10 MG
TABLET ORAL
Qty: 90 TAB | Refills: 0 | OUTPATIENT
Start: 2020-12-08

## 2021-03-10 ENCOUNTER — HOSPITAL ENCOUNTER (OUTPATIENT)
Dept: LAB | Facility: MEDICAL CENTER | Age: 61
End: 2021-03-10
Attending: FAMILY MEDICINE
Payer: COMMERCIAL

## 2021-03-10 DIAGNOSIS — R73.03 PREDIABETES: ICD-10-CM

## 2021-03-10 DIAGNOSIS — E78.5 DYSLIPIDEMIA: ICD-10-CM

## 2021-03-10 DIAGNOSIS — N18.1 STAGE 1 CHRONIC KIDNEY DISEASE: ICD-10-CM

## 2021-03-10 DIAGNOSIS — Z11.59 NEED FOR HEPATITIS C SCREENING TEST: ICD-10-CM

## 2021-03-10 LAB
ANION GAP SERPL CALC-SCNC: 9 MMOL/L (ref 7–16)
BUN SERPL-MCNC: 15 MG/DL (ref 8–22)
CALCIUM SERPL-MCNC: 9.3 MG/DL (ref 8.5–10.5)
CHLORIDE SERPL-SCNC: 104 MMOL/L (ref 96–112)
CHOLEST SERPL-MCNC: 212 MG/DL (ref 100–199)
CO2 SERPL-SCNC: 28 MMOL/L (ref 20–33)
CREAT SERPL-MCNC: 0.65 MG/DL (ref 0.5–1.4)
CREAT UR-MCNC: 158.11 MG/DL
EST. AVERAGE GLUCOSE BLD GHB EST-MCNC: 137 MG/DL
FASTING STATUS PATIENT QL REPORTED: NORMAL
GLUCOSE SERPL-MCNC: 110 MG/DL (ref 65–99)
HBA1C MFR BLD: 6.4 % (ref 4–5.6)
HCV AB SER QL: NORMAL
HDLC SERPL-MCNC: 58 MG/DL
LDLC SERPL CALC-MCNC: 97 MG/DL
MICROALBUMIN UR-MCNC: 4.5 MG/DL
MICROALBUMIN/CREAT UR: 28 MG/G (ref 0–30)
POTASSIUM SERPL-SCNC: 4.2 MMOL/L (ref 3.6–5.5)
SODIUM SERPL-SCNC: 141 MMOL/L (ref 135–145)
TRIGL SERPL-MCNC: 287 MG/DL (ref 0–149)

## 2021-03-10 PROCEDURE — 80048 BASIC METABOLIC PNL TOTAL CA: CPT

## 2021-03-10 PROCEDURE — 82043 UR ALBUMIN QUANTITATIVE: CPT

## 2021-03-10 PROCEDURE — 36415 COLL VENOUS BLD VENIPUNCTURE: CPT

## 2021-03-10 PROCEDURE — 80061 LIPID PANEL: CPT

## 2021-03-10 PROCEDURE — 86803 HEPATITIS C AB TEST: CPT

## 2021-03-10 PROCEDURE — 82570 ASSAY OF URINE CREATININE: CPT

## 2021-03-10 PROCEDURE — 83036 HEMOGLOBIN GLYCOSYLATED A1C: CPT

## 2021-03-15 DIAGNOSIS — Z23 NEED FOR VACCINATION: ICD-10-CM

## 2021-03-22 SDOH — HEALTH STABILITY: MENTAL HEALTH
STRESS IS WHEN SOMEONE FEELS TENSE, NERVOUS, ANXIOUS, OR CAN'T SLEEP AT NIGHT BECAUSE THEIR MIND IS TROUBLED. HOW STRESSED ARE YOU?: ONLY A LITTLE

## 2021-03-22 SDOH — ECONOMIC STABILITY: HOUSING INSECURITY
IN THE LAST 12 MONTHS, WAS THERE A TIME WHEN YOU DID NOT HAVE A STEADY PLACE TO SLEEP OR SLEPT IN A SHELTER (INCLUDING NOW)?: NO

## 2021-03-22 SDOH — ECONOMIC STABILITY: INCOME INSECURITY: IN THE LAST 12 MONTHS, WAS THERE A TIME WHEN YOU WERE NOT ABLE TO PAY THE MORTGAGE OR RENT ON TIME?: NO

## 2021-03-22 SDOH — ECONOMIC STABILITY: TRANSPORTATION INSECURITY
IN THE PAST 12 MONTHS, HAS THE LACK OF TRANSPORTATION KEPT YOU FROM MEDICAL APPOINTMENTS OR FROM GETTING MEDICATIONS?: NO

## 2021-03-22 SDOH — ECONOMIC STABILITY: HOUSING INSECURITY: IN THE LAST 12 MONTHS, HOW MANY PLACES HAVE YOU LIVED?: 1

## 2021-03-22 SDOH — HEALTH STABILITY: PHYSICAL HEALTH: ON AVERAGE, HOW MANY DAYS PER WEEK DO YOU ENGAGE IN MODERATE TO STRENUOUS EXERCISE (LIKE A BRISK WALK)?: 0 DAYS

## 2021-03-22 SDOH — ECONOMIC STABILITY: TRANSPORTATION INSECURITY
IN THE PAST 12 MONTHS, HAS LACK OF RELIABLE TRANSPORTATION KEPT YOU FROM MEDICAL APPOINTMENTS, MEETINGS, WORK OR FROM GETTING THINGS NEEDED FOR DAILY LIVING?: NO

## 2021-03-22 SDOH — HEALTH STABILITY: PHYSICAL HEALTH: ON AVERAGE, HOW MANY MINUTES DO YOU ENGAGE IN EXERCISE AT THIS LEVEL?: 0 MINUTES

## 2021-03-22 ASSESSMENT — SOCIAL DETERMINANTS OF HEALTH (SDOH)
HOW MANY DRINKS CONTAINING ALCOHOL DO YOU HAVE ON A TYPICAL DAY WHEN YOU ARE DRINKING: 1 OR 2
HOW OFTEN DO YOU ATTEND CHURCH OR RELIGIOUS SERVICES?: MORE THAN 4 TIMES PER YEAR
HOW OFTEN DO YOU GET TOGETHER WITH FRIENDS OR RELATIVES?: ONCE A WEEK
HOW OFTEN DO YOU HAVE A DRINK CONTAINING ALCOHOL: MONTHLY OR LESS
WITHIN THE PAST 12 MONTHS, THE FOOD YOU BOUGHT JUST DIDN'T LAST AND YOU DIDN'T HAVE MONEY TO GET MORE: NEVER TRUE
WITHIN THE PAST 12 MONTHS, YOU WORRIED THAT YOUR FOOD WOULD RUN OUT BEFORE YOU GOT THE MONEY TO BUY MORE: NEVER TRUE
HOW OFTEN DO YOU HAVE SIX OR MORE DRINKS ON ONE OCCASION: NEVER
IN A TYPICAL WEEK, HOW MANY TIMES DO YOU TALK ON THE PHONE WITH FAMILY, FRIENDS, OR NEIGHBORS?: ONCE A WEEK
HOW OFTEN DO YOU ATTENT MEETINGS OF THE CLUB OR ORGANIZATION YOU BELONG TO?: NEVER

## 2021-03-23 ENCOUNTER — OFFICE VISIT (OUTPATIENT)
Dept: MEDICAL GROUP | Facility: MEDICAL CENTER | Age: 61
End: 2021-03-23
Payer: COMMERCIAL

## 2021-03-23 VITALS
OXYGEN SATURATION: 97 % | HEART RATE: 92 BPM | DIASTOLIC BLOOD PRESSURE: 82 MMHG | RESPIRATION RATE: 16 BRPM | SYSTOLIC BLOOD PRESSURE: 140 MMHG | HEIGHT: 60 IN | WEIGHT: 125.4 LBS | TEMPERATURE: 98.2 F | BODY MASS INDEX: 24.62 KG/M2

## 2021-03-23 DIAGNOSIS — I10 ESSENTIAL HYPERTENSION: ICD-10-CM

## 2021-03-23 DIAGNOSIS — M75.42 IMPINGEMENT SYNDROME OF SHOULDER REGION, LEFT: ICD-10-CM

## 2021-03-23 DIAGNOSIS — E11.9 TYPE 2 DIABETES MELLITUS WITHOUT COMPLICATION, WITHOUT LONG-TERM CURRENT USE OF INSULIN (HCC): ICD-10-CM

## 2021-03-23 DIAGNOSIS — M54.9 BACK PAIN, UNSPECIFIED BACK LOCATION, UNSPECIFIED BACK PAIN LATERALITY, UNSPECIFIED CHRONICITY: ICD-10-CM

## 2021-03-23 DIAGNOSIS — E78.5 DYSLIPIDEMIA: ICD-10-CM

## 2021-03-23 PROCEDURE — 99396 PREV VISIT EST AGE 40-64: CPT | Performed by: PHYSICIAN ASSISTANT

## 2021-03-23 RX ORDER — METFORMIN HYDROCHLORIDE 500 MG/1
500 TABLET, EXTENDED RELEASE ORAL DAILY
Qty: 90 TABLET | Refills: 3 | Status: SHIPPED | OUTPATIENT
Start: 2021-03-23 | End: 2022-05-19 | Stop reason: SDUPTHER

## 2021-03-23 RX ORDER — IBUPROFEN 800 MG/1
800 TABLET ORAL
Qty: 90 TABLET | Refills: 1 | Status: SHIPPED | OUTPATIENT
Start: 2021-03-23 | End: 2022-05-19 | Stop reason: SDUPTHER

## 2021-03-23 RX ORDER — LOSARTAN POTASSIUM 50 MG/1
50 TABLET ORAL DAILY
Qty: 90 TABLET | Refills: 2 | Status: SHIPPED | OUTPATIENT
Start: 2021-03-23 | End: 2021-12-28

## 2021-03-23 RX ORDER — CARISOPRODOL 350 MG/1
350 TABLET ORAL
Qty: 90 TABLET | Refills: 0 | Status: SHIPPED | OUTPATIENT
Start: 2021-03-23 | End: 2021-06-21

## 2021-03-23 NOTE — PROGRESS NOTES
"Chief Complaint   Patient presents with   • Annual Exam       HPI  Marilou Arias Reyes is a 60 y.o. female here today for annual exam.    HPI:   Patient has had prediabetes, states that she likes sweets and desserts.  Last labs shows A1c at 6.4 so diabetic at this point.  Currently not taking any medicine for it.  .    Patient has chronic back pain and L shoulder pain.  Takes ibuprofen 800 mg as needed.  Also Flexeril and  Soma as needed.   GFR is above 60, microalbumin creatinine ratio has improved from 196--> 28.  Patient continues on losartan 50 mg daily            Exam:  /82 (BP Location: Right arm, Patient Position: Sitting)   Pulse 92   Temp 36.8 °C (98.2 °F) (Temporal)   Resp 16   Ht 1.518 m (4' 11.75\")   Wt 56.9 kg (125 lb 6.4 oz)   SpO2 97%   Constitutional: Alert, no distress, plus 3 vital signs  Respiratory: Unlabored respiratory effort, lungs clear to auscultation, no wheezes, no rhonchi  Cardiovascular: RRR, no murmur, no lower extremities edema, pedal pulses equal bilaterally and 2+/4   Ears:  External ears unremarkable. TMs pearly gray, clear and intact, w/o any perforation or effusion.  Physical Exam   Constitutional: She is well-developed, well-nourished, and in no distress. No distress.   HENT:   Head: Atraumatic.   Eyes: Pupils are equal, round, and reactive to light. Right eye exhibits no discharge. No scleral icterus.   Abdominal: Soft. She exhibits no distension. There is no abdominal tenderness. There is no rebound.             1. Dyslipidemia    - Lipid Profile; Future    2. Type 2 diabetes mellitus without complication, without long-term current use of insulin (HCC)  Starting patient on Metformin  - HEMOGLOBIN A1C; Future  - MICROALBUMIN CREAT RATIO URINE; Future  - Basic Metabolic Panel; Future  - metFORMIN ER (GLUCOPHAGE XR) 500 MG TABLET SR 24 HR; Take 1 tablet by mouth every day.  Dispense: 90 tablet; Refill: 3    3. Back pain, unspecified back location, unspecified back pain " laterality, unspecified chronicity  GFR above 60     was verified by me today    - ibuprofen (MOTRIN) 800 MG Tab; Take 1 tablet by mouth 1 time a day as needed for Mild Pain.  Dispense: 90 tablet; Refill: 1  - carisoprodol (SOMA) 350 MG Tab; Take 1 tablet by mouth 1 time a day as needed for Muscle Spasms for up to 90 days.  Dispense: 90 tablet; Refill: 0    4. Impingement syndrome of shoulder region, left  Patient has done PT without much help.  Was told she has adhesive capsulitis or frozen shoulder syndrome.  Does not want to do steroid or steroid injections.    5. Essential hypertension    - losartan (COZAAR) 50 MG Tab; Take 1 tablet by mouth every day.  Dispense: 90 tablet; Refill: 2          F/U: 6 months

## 2021-03-28 DIAGNOSIS — M25.511 CHRONIC PAIN OF BOTH SHOULDERS: ICD-10-CM

## 2021-03-28 DIAGNOSIS — G89.29 CHRONIC PAIN OF BOTH SHOULDERS: ICD-10-CM

## 2021-03-28 DIAGNOSIS — M25.512 CHRONIC PAIN OF BOTH SHOULDERS: ICD-10-CM

## 2021-03-29 RX ORDER — CYCLOBENZAPRINE HCL 10 MG
TABLET ORAL
Qty: 90 TABLET | Refills: 0 | Status: SHIPPED | OUTPATIENT
Start: 2021-03-29 | End: 2021-10-12

## 2021-05-03 DIAGNOSIS — J45.20 MILD INTERMITTENT REACTIVE AIRWAY DISEASE WITHOUT COMPLICATION: ICD-10-CM

## 2021-05-04 RX ORDER — ALBUTEROL SULFATE 90 UG/1
2 AEROSOL, METERED RESPIRATORY (INHALATION) EVERY 6 HOURS PRN
Qty: 8.5 G | Refills: 0 | Status: SHIPPED | OUTPATIENT
Start: 2021-05-04

## 2021-09-08 ENCOUNTER — HOSPITAL ENCOUNTER (OUTPATIENT)
Dept: LAB | Facility: MEDICAL CENTER | Age: 61
End: 2021-09-08
Attending: PHYSICIAN ASSISTANT
Payer: COMMERCIAL

## 2021-09-08 DIAGNOSIS — E78.5 DYSLIPIDEMIA: ICD-10-CM

## 2021-09-08 DIAGNOSIS — E11.9 TYPE 2 DIABETES MELLITUS WITHOUT COMPLICATION, WITHOUT LONG-TERM CURRENT USE OF INSULIN (HCC): ICD-10-CM

## 2021-09-08 LAB
ANION GAP SERPL CALC-SCNC: 14 MMOL/L (ref 7–16)
BUN SERPL-MCNC: 16 MG/DL (ref 8–22)
CALCIUM SERPL-MCNC: 9.9 MG/DL (ref 8.5–10.5)
CHLORIDE SERPL-SCNC: 99 MMOL/L (ref 96–112)
CHOLEST SERPL-MCNC: 264 MG/DL (ref 100–199)
CO2 SERPL-SCNC: 27 MMOL/L (ref 20–33)
CREAT SERPL-MCNC: 0.59 MG/DL (ref 0.5–1.4)
CREAT UR-MCNC: 240.89 MG/DL
EST. AVERAGE GLUCOSE BLD GHB EST-MCNC: 126 MG/DL
GLUCOSE SERPL-MCNC: 98 MG/DL (ref 65–99)
HBA1C MFR BLD: 6 % (ref 4–5.6)
HDLC SERPL-MCNC: 51 MG/DL
LDLC SERPL CALC-MCNC: ABNORMAL MG/DL
MICROALBUMIN UR-MCNC: 2.2 MG/DL
MICROALBUMIN/CREAT UR: 9 MG/G (ref 0–30)
POTASSIUM SERPL-SCNC: 4.6 MMOL/L (ref 3.6–5.5)
SODIUM SERPL-SCNC: 140 MMOL/L (ref 135–145)
TRIGL SERPL-MCNC: 470 MG/DL (ref 0–149)

## 2021-09-08 PROCEDURE — 82570 ASSAY OF URINE CREATININE: CPT

## 2021-09-08 PROCEDURE — 80061 LIPID PANEL: CPT

## 2021-09-08 PROCEDURE — 36415 COLL VENOUS BLD VENIPUNCTURE: CPT

## 2021-09-08 PROCEDURE — 80048 BASIC METABOLIC PNL TOTAL CA: CPT

## 2021-09-08 PROCEDURE — 82043 UR ALBUMIN QUANTITATIVE: CPT

## 2021-09-08 PROCEDURE — 83036 HEMOGLOBIN GLYCOSYLATED A1C: CPT

## 2021-09-23 ENCOUNTER — OFFICE VISIT (OUTPATIENT)
Dept: MEDICAL GROUP | Facility: MEDICAL CENTER | Age: 61
End: 2021-09-23
Payer: COMMERCIAL

## 2021-09-23 VITALS
RESPIRATION RATE: 16 BRPM | TEMPERATURE: 98.1 F | HEIGHT: 60 IN | SYSTOLIC BLOOD PRESSURE: 126 MMHG | HEART RATE: 80 BPM | WEIGHT: 121.25 LBS | BODY MASS INDEX: 23.81 KG/M2 | OXYGEN SATURATION: 95 % | DIASTOLIC BLOOD PRESSURE: 76 MMHG

## 2021-09-23 DIAGNOSIS — M75.02 ADHESIVE CAPSULITIS OF LEFT SHOULDER: ICD-10-CM

## 2021-09-23 DIAGNOSIS — E78.5 HYPERLIPIDEMIA, UNSPECIFIED HYPERLIPIDEMIA TYPE: ICD-10-CM

## 2021-09-23 DIAGNOSIS — R73.03 PREDIABETES: ICD-10-CM

## 2021-09-23 DIAGNOSIS — G89.29 CHRONIC LEFT SHOULDER PAIN: ICD-10-CM

## 2021-09-23 DIAGNOSIS — I15.9 SECONDARY HYPERTENSION: ICD-10-CM

## 2021-09-23 DIAGNOSIS — M25.512 CHRONIC LEFT SHOULDER PAIN: ICD-10-CM

## 2021-09-23 PROCEDURE — 99214 OFFICE O/P EST MOD 30 MIN: CPT | Performed by: FAMILY MEDICINE

## 2021-09-23 RX ORDER — AMPICILLIN TRIHYDRATE 250 MG
1000 CAPSULE ORAL 2 TIMES DAILY
COMMUNITY

## 2021-09-23 NOTE — ASSESSMENT & PLAN NOTE
Has been working with PT   Concerns for frozen shoulder     Plan to consult with [facility]   She has been offered steroid injection in the past and I take extra time to explain benefits if offered at the time of her consultation, x rays prior

## 2021-09-23 NOTE — ASSESSMENT & PLAN NOTE
She is having nausea /diarrhea with metformin and it is ok for her to hold off   New dx discussed   Reviewed historical a1c   Reviewed normal values   Reviewed lifestyle mod   Follow up 6 mo labs prior

## 2021-09-23 NOTE — PROGRESS NOTES
This medical record contains text that has been entered with the assistance of computer voice recognition and dictation software.  Therefore, it may contain unintended errors in text, spelling, punctuation, or grammar        Chief Complaint   Patient presents with   • Lab Results     recent labs   • Shoulder Pain     left shoulder pain, starting in right now       Marilou Arias Reyes is a 60 y.o. female here evaluation and management of:     New dx prediabetes offered metformin also noted to have elevated TG and encouraged to schedule with me to discuss         Current Outpatient Medications   Medication Sig Dispense Refill   • Cinnamon 500 MG Cap Take 1,000 mg by mouth every day.     • albuterol 108 (90 Base) MCG/ACT Aero Soln inhalation aerosol Inhale 2 Puffs every 6 hours as needed for Shortness of Breath. 8.5 g 0   • cyclobenzaprine (FLEXERIL) 10 mg Tab TAKE ONE TABLET BY MOUTH THREE TIMES DAILY AS NEEDED  90 tablet 0   • losartan (COZAAR) 50 MG Tab Take 1 tablet by mouth every day. 90 tablet 2   • ibuprofen (MOTRIN) 800 MG Tab Take 1 tablet by mouth 1 time a day as needed for Mild Pain. 90 tablet 1   • metFORMIN ER (GLUCOPHAGE XR) 500 MG TABLET SR 24 HR Take 1 tablet by mouth every day. 90 tablet 3   • diclofenac sodium 1 % Gel      • omeprazole (PRILOSEC) 20 MG delayed-release capsule TAKE ONE CAPSULE BY MOUTH ONE TIME DAILY 90 Cap 2   • carisoprodol (SOMA) 350 MG Tab Take 350 mg by mouth 4 times a day.     • ascorbic acid (ASCORBIC ACID) 500 MG Tab Take 500 mg by mouth every day.     • fluticasone (FLONASE) 50 MCG/ACT nasal spray Spray 1 Spray in nose every day. 2 Bottle 11   • Cholecalciferol (VITAMIN D PO) Take 2,000 mg by mouth every day.     • Calcium 1500 MG Tab Take 1 Tab by mouth every day.     • Glucosamine HCl (GLUCOSAMINE PO) Take 1 Tab by mouth every day.     • Omega-3 Fatty Acids (FISH OIL) 1000 MG Cap capsule Take 1,000 mg by mouth every day.     • Multiple Vitamins-Minerals (MULTIVITAMIN PO)  "Take 1 Tab by mouth every day.     • Diclofenac Sodium (VOLTAREN) 1 % Gel Apply 4 g to skin as directed 2 Times a Day. 100 g 11   • Diclofenac Sodium 1 % Gel APPLY 1 GRAM EXTERNALLY TO SKIN  OF AFFECTED AREA(S) FOUR TIMES DAILY 100 g 0     No current facility-administered medications for this visit.     Patient Active Problem List    Diagnosis Date Noted   • Right shoulder pain 07/15/2020   • Impingement syndrome of shoulder region, left 07/15/2020   • Cervical spondylosis 07/15/2020   • Dyslipidemia 07/15/2020   • Stage 1 chronic kidney disease 07/15/2020   • Reactive airway disease without complication 06/20/2019   • Chronic left shoulder pain 05/26/2017   • Prediabetes 04/01/2016   • Environmental allergies 01/28/2016   • Hypertension 01/28/2016   • Hyperlipidemia 01/28/2016   • Chronic neck pain 01/28/2016   • Vitamin D insufficiency 01/28/2016   • Depression 11/01/2013     Past Surgical History:   Procedure Laterality Date   • CHOLECYSTECTOMY     • PRIMARY C SECTION        Social History     Tobacco Use   • Smoking status: Never Smoker   • Smokeless tobacco: Never Used   Vaping Use   • Vaping Use: Never used   Substance Use Topics   • Alcohol use: Yes     Comment: Rare   • Drug use: No     Family History   Problem Relation Age of Onset   • Lung Disease Mother         COPD   • Diabetes Mother    • Hypertension Mother    • Heart Disease Father    • Hypertension Father    • Cancer Sister         Breast   • Cancer Brother         Colon, prostate   • Alcohol/Drug Neg Hx    • Stroke Neg Hx            ROS    all review of system completed and negative except for those listed above     Objective:     /76 (BP Location: Left arm, Patient Position: Sitting, BP Cuff Size: Adult)   Pulse 80   Temp 36.7 °C (98.1 °F) (Temporal)   Resp 16   Ht 1.518 m (4' 11.75\")   Wt 55 kg (121 lb 4.1 oz)   SpO2 95%  Body mass index is 23.88 kg/m².  Physical Exam:        GEN: comfortable, alert and oriented, well nourished, well " developed, in no apparent distress   HEENT: NCAT, eyes: pupils equal and reactive, sclera white, EOMIT, good dentition  HEART: limbs warm and well perfused, regular rate, no JVD, no lower extremity edema  LUNGS: speaking in full sentences, not in apparent respiratory distress, no audible wheezes  MSK: normal tone and bulk, no swelling of the joints, gait steady and normal       Assessment and Plan:   The following treatment plan was discussed        Problem List Items Addressed This Visit     Hypertension     At goal today            Relevant Orders    Basic Metabolic Panel    HEMOGLOBIN A1C    Lipid Profile    LDL, DIRECT    MICROALBUMIN CREAT RATIO URINE    Hyperlipidemia     Specifically TG  Hold off on statin for now   Lifestyle mod discussed   Recheck 6 mo              Relevant Orders    Basic Metabolic Panel    HEMOGLOBIN A1C    Lipid Profile    LDL, DIRECT    MICROALBUMIN CREAT RATIO URINE    Prediabetes     She is having nausea /diarrhea with metformin and it is ok for her to hold off   New dx discussed   Reviewed historical a1c   Reviewed normal values   Reviewed lifestyle mod   Follow up 6 mo labs prior              Relevant Orders    Basic Metabolic Panel    HEMOGLOBIN A1C    Lipid Profile    LDL, DIRECT    MICROALBUMIN CREAT RATIO URINE    Chronic left shoulder pain     Has been working with PT   Concerns for frozen shoulder     Plan to consult with [facility]   She has been offered steroid injection in the past and I take extra time to explain benefits if offered at the time of her consultation, x rays prior                Other Visit Diagnoses     Adhesive capsulitis of left shoulder        Relevant Orders    REFERRAL TO SPORTS MEDICINE    DX-SHOULDER 2+ LEFT                Instructed to follow up if symptoms worsen or fail to improve, ER/UC precautions discussed as well    Geno Benitez MD  [facility], Family Medicine   [address] [place] [address] Pkw[address]   [place] [id number]  Phone: [phone]

## 2021-10-09 DIAGNOSIS — M25.512 CHRONIC PAIN OF BOTH SHOULDERS: ICD-10-CM

## 2021-10-09 DIAGNOSIS — G89.29 CHRONIC PAIN OF BOTH SHOULDERS: ICD-10-CM

## 2021-10-09 DIAGNOSIS — M25.511 CHRONIC PAIN OF BOTH SHOULDERS: ICD-10-CM

## 2021-10-11 ENCOUNTER — OFFICE VISIT (OUTPATIENT)
Dept: SPORTS MEDICINE | Facility: CLINIC | Age: 61
End: 2021-10-11
Payer: COMMERCIAL

## 2021-10-11 VITALS
RESPIRATION RATE: 18 BRPM | SYSTOLIC BLOOD PRESSURE: 128 MMHG | HEIGHT: 60 IN | WEIGHT: 121.26 LBS | HEART RATE: 92 BPM | TEMPERATURE: 98.4 F | BODY MASS INDEX: 23.81 KG/M2 | OXYGEN SATURATION: 95 % | DIASTOLIC BLOOD PRESSURE: 84 MMHG

## 2021-10-11 DIAGNOSIS — M19.012 OSTEOARTHRITIS OF GLENOHUMERAL JOINT, LEFT: ICD-10-CM

## 2021-10-11 DIAGNOSIS — M19.012 ARTHRITIS OF LEFT ACROMIOCLAVICULAR JOINT: ICD-10-CM

## 2021-10-11 DIAGNOSIS — M75.82 ROTATOR CUFF TENDONITIS, LEFT: ICD-10-CM

## 2021-10-11 PROCEDURE — 20610 DRAIN/INJ JOINT/BURSA W/O US: CPT | Mod: LT | Performed by: FAMILY MEDICINE

## 2021-10-11 PROCEDURE — 99213 OFFICE O/P EST LOW 20 MIN: CPT | Mod: 25 | Performed by: FAMILY MEDICINE

## 2021-10-11 RX ORDER — TRIAMCINOLONE ACETONIDE 40 MG/ML
40 INJECTION, SUSPENSION INTRA-ARTICULAR; INTRAMUSCULAR ONCE
Status: COMPLETED | OUTPATIENT
Start: 2021-10-11 | End: 2021-10-11

## 2021-10-11 RX ADMIN — TRIAMCINOLONE ACETONIDE 40 MG: 40 INJECTION, SUSPENSION INTRA-ARTICULAR; INTRAMUSCULAR at 11:34

## 2021-10-11 NOTE — PROCEDURES
PROCEDURE NOTE:  LEFT shoulder intra-articular corticosteroid injection  Risks and benefits discussed  Informed consent obtained  Shoulder prepped in sterile fashion utilizing a posterior approach  40 mg of Kenalog and 5 cc of bupivacaine injected into the glenohumeral space   Vapocoolant spray was utilized  Patient tolerated the procedure well  Postprocedure care and red flags discussed

## 2021-10-11 NOTE — PROGRESS NOTES
CHIEF COMPLAINT:    Referred by Geno Benitez MD  for evaluation of chronic left shoulder pain    Marilou Arias Reyes is complaining of left shoulder pain  On and off for nearly 15 years, episodes since back then  Worse over the past 3 months  Pain is at the superior  Quality is aching, sharp  Pain is Radiating down the LEFT arm above the elbow  Aggravated by overhead activity and closing blinds , works as a pharmacist  Improved with  rest   No recent injury  Prior Treatments: Prior PT which helped minimally  Prior studies: X-Ray   Medications tried for pain include: ibuprofen (OTC) 800 mg helps some  Mechanical Symptom history: No Locking, Popping and painless    REVIEW OF SYSTEMS  No Nausea, No Vomiting, No Chest Pain, No Shortness of Breath, No Dizziness, Headache    PAST MEDICAL HISTORY:   History reviewed. No pertinent past medical history.    PMH:  has a past medical history of Arthritis, Gestational diabetes, Hypertension, and Prolactinoma (HCC).  MEDS:   Current Outpatient Medications:   •  Cinnamon 500 MG Cap, Take 1,000 mg by mouth every day., Disp: , Rfl:   •  albuterol 108 (90 Base) MCG/ACT Aero Soln inhalation aerosol, Inhale 2 Puffs every 6 hours as needed for Shortness of Breath., Disp: 8.5 g, Rfl: 0  •  cyclobenzaprine (FLEXERIL) 10 mg Tab, TAKE ONE TABLET BY MOUTH THREE TIMES DAILY AS NEEDED , Disp: 90 tablet, Rfl: 0  •  losartan (COZAAR) 50 MG Tab, Take 1 tablet by mouth every day., Disp: 90 tablet, Rfl: 2  •  ibuprofen (MOTRIN) 800 MG Tab, Take 1 tablet by mouth 1 time a day as needed for Mild Pain., Disp: 90 tablet, Rfl: 1  •  metFORMIN ER (GLUCOPHAGE XR) 500 MG TABLET SR 24 HR, Take 1 tablet by mouth every day., Disp: 90 tablet, Rfl: 3  •  diclofenac sodium 1 % Gel, , Disp: , Rfl:   •  omeprazole (PRILOSEC) 20 MG delayed-release capsule, TAKE ONE CAPSULE BY MOUTH ONE TIME DAILY, Disp: 90 Cap, Rfl: 2  •  carisoprodol (SOMA) 350 MG Tab, Take 350 mg by mouth 4 times a day., Disp: , Rfl:   •   "Diclofenac Sodium (VOLTAREN) 1 % Gel, Apply 4 g to skin as directed 2 Times a Day., Disp: 100 g, Rfl: 11  •  Diclofenac Sodium 1 % Gel, APPLY 1 GRAM EXTERNALLY TO SKIN  OF AFFECTED AREA(S) FOUR TIMES DAILY, Disp: 100 g, Rfl: 0  •  ascorbic acid (ASCORBIC ACID) 500 MG Tab, Take 500 mg by mouth every day., Disp: , Rfl:   •  fluticasone (FLONASE) 50 MCG/ACT nasal spray, Spray 1 Spray in nose every day., Disp: 2 Bottle, Rfl: 11  •  Cholecalciferol (VITAMIN D PO), Take 2,000 mg by mouth every day., Disp: , Rfl:   •  Calcium 1500 MG Tab, Take 1 Tab by mouth every day., Disp: , Rfl:   •  Glucosamine HCl (GLUCOSAMINE PO), Take 1 Tab by mouth every day., Disp: , Rfl:   •  Omega-3 Fatty Acids (FISH OIL) 1000 MG Cap capsule, Take 1,000 mg by mouth every day., Disp: , Rfl:   •  Multiple Vitamins-Minerals (MULTIVITAMIN PO), Take 1 Tab by mouth every day., Disp: , Rfl:   ALLERGIES: No Known Allergies  SURGHX:   Past Surgical History:   Procedure Laterality Date   • CHOLECYSTECTOMY     • PRIMARY C SECTION       SOCHX:  reports that she has never smoked. She has never used smokeless tobacco. She reports current alcohol use. She reports that she does not use drugs.  FH: Family history was reviewed, no pertinent findings to report     PHYSICAL EXAM:  /84 (BP Location: Left arm, Patient Position: Sitting, BP Cuff Size: Adult)   Pulse 92   Temp 36.9 °C (98.4 °F) (Temporal)   Resp 18   Ht 1.518 m (4' 11.75\")   Wt 55 kg (121 lb 4.1 oz)   SpO2 95%   BMI 23.88 kg/m²      well-developed, well-nourished in no apparent distress, alert and oriented x 3.  Gait: normal    Shoulder Exam:    RIGHT Shoulder:  No visible swelling   Range of motion INTACT  Tenderness: Non-tender  Empty Can Testing 5/5  Internal Rotation 5/5  External Rotation 5/5  Lift Off Testing 5/5  Impingement testing Mccauley  NEGATIVE  Neer's testing NEGATIVE  Apprehension testing NEGATIVE  Relocation testing NEGATIVE  Smith's Testing NEGATIVE  Grind Testing " NEGATIVE      LEFT Shoulder:  No visible swelling   Range of motion INTACT  Tenderness: AC Joint Tenderness  Empty Can Testing 5/5  Internal Rotation 5/5  External Rotation 5/5  Lift Off Testing 5/5  Impingement testing [place]y  POSITIVE  Neer's testing POSITIVE  Apprehension testing POSITIVE  Relocation testing NEGATIVE  Smith's Testing NEGATIVE  Grind Testing NEGATIVE      Additional Findings: Flexed Posture      1. Osteoarthritis of glenohumeral joint, left  triamcinolone acetonide (KENALOG-40) injection 40 mg   2. Rotator cuff tendonitis, left  triamcinolone acetonide (KENALOG-40) injection 40 mg   3. Arthritis of left acromioclavicular joint        On and off for nearly 15 years, episodes since back then  Worse over the past 3 months  Pain is at the superior    She has been offered corticosteroid injections in the past but has refused  Acupuncture was offered in the past, but she did NOT follow through    RIGHT glenohumeral corticosteroid injection performed in the office TODAY (October 11, 2021)    Return in about 4 weeks (around 11/8/2021).   To see how she is doing after LEFT intra-articular GH injection          Results for orders placed during the hospital encounter of 04/25/08   DX-CERVICAL SPINE-2 OR 3 VIEWS    Impression IMPRESSION:     1. MINIMAL MID-CERVICAL SPINE DEGENERATIVE DISK DISEASE AND MINIMAL   ANTERIOR WEDGING OF THE C5 VERTEBRAL BODY.    2. NO ACUTE CERVICAL SPINE ABNORMALITIES.                                                                              Results for orders placed during the hospital encounter of 02/07/18   DX-SHOULDER 2+ LEFT    Impression Unchanged shoulder series.    Moderate glenohumeral, mild acromial clavicular joint osteoarthritis    Healed distal thirds clavicle fracture                 Thank you Geno Benitez MD for allowing me to participate in caring for your patient.

## 2021-10-12 RX ORDER — CYCLOBENZAPRINE HCL 10 MG
TABLET ORAL
Qty: 90 TABLET | Refills: 0 | Status: SHIPPED | OUTPATIENT
Start: 2021-10-12 | End: 2022-05-19 | Stop reason: SDUPTHER

## 2021-10-25 ENCOUNTER — HOSPITAL ENCOUNTER (OUTPATIENT)
Dept: RADIOLOGY | Facility: MEDICAL CENTER | Age: 61
End: 2021-10-25
Attending: OBSTETRICS & GYNECOLOGY
Payer: COMMERCIAL

## 2021-10-25 DIAGNOSIS — Z12.39 ENCOUNTER FOR OTHER SCREENING FOR MALIGNANT NEOPLASM OF BREAST: ICD-10-CM

## 2021-10-25 DIAGNOSIS — Z12.31 VISIT FOR SCREENING MAMMOGRAM: ICD-10-CM

## 2021-10-25 PROCEDURE — 76641 ULTRASOUND BREAST COMPLETE: CPT

## 2021-10-25 PROCEDURE — 77063 BREAST TOMOSYNTHESIS BI: CPT

## 2021-11-02 ENCOUNTER — HOSPITAL ENCOUNTER (OUTPATIENT)
Dept: RADIOLOGY | Facility: MEDICAL CENTER | Age: 61
End: 2021-11-02
Attending: OBSTETRICS & GYNECOLOGY
Payer: COMMERCIAL

## 2021-11-02 DIAGNOSIS — R92.8 ABNORMAL FINDING ON BREAST IMAGING: ICD-10-CM

## 2021-11-02 PROCEDURE — 76642 ULTRASOUND BREAST LIMITED: CPT | Mod: RT

## 2021-11-08 ENCOUNTER — TELEMEDICINE (OUTPATIENT)
Dept: MEDICAL GROUP | Facility: MEDICAL CENTER | Age: 61
End: 2021-11-08
Payer: COMMERCIAL

## 2021-11-08 VITALS — HEIGHT: 60 IN | WEIGHT: 118.4 LBS | BODY MASS INDEX: 23.25 KG/M2 | TEMPERATURE: 98 F

## 2021-11-08 DIAGNOSIS — U07.1 COVID-19: ICD-10-CM

## 2021-11-08 PROCEDURE — 99213 OFFICE O/P EST LOW 20 MIN: CPT | Mod: 95,CS | Performed by: PHYSICIAN ASSISTANT

## 2021-11-08 NOTE — LETTER
November 8, 2021        RE: Marilou Arias Reyes      Patient seen today via virtual zoom appointment for discussion of positive at home Covid 19 test. She is scheduled to have a PCR with [facility] on 11/10/2021. She started with symptoms on Thursday, November 4th and took the at home test last night.  Patient is currently recommended to quarantine on 11/9/2021 through 11/19/21.            Thank you for your time,              Francine Edwards P.A.-C.

## 2021-11-09 NOTE — PROGRESS NOTES
Virtual Visit: Established Patient   This visit was conducted via Zoom using secure and encrypted videoconferencing technology.   The patient was in a private location in the state of [place].    The patient's identity was confirmed and verbal consent was obtained for this virtual visit.    Subjective:   CC:   Chief Complaint   Patient presents with   • Cough     Loss of smell & taste, congestion, runny nose, fever, bodyaches, chills, positive covid test x 4 days ago     Marilou Arias Reyes is a 61 y.o. female presenting for evaluation and management of:  Started feeling sick Thursday evening.  At home test last night.  Scheduled for PCR with health department on Wednesday  Covid 19 vaccinations Dec/Ezequiel and then booster 1.5 weeks ago, moderna  HTN well controlled  History of pneumonia 6 years ago - got it went she traveled to the [facility]    Loss of taste and smell, congestion, runny, fever/chills, body aches. Cough on and off. Tickle in throat. Sometimes mucus will come out with cough. Taking ibuprofen and benadryl    ROS   No weight change. No neck pain or stiffness. No chest pain. No n/v/d/c or abdominal pain. No rash or skin lesion. No urinary symptoms.     Current medicines (including changes today)  Current Outpatient Medications   Medication Sig Dispense Refill   • cyclobenzaprine (FLEXERIL) 10 mg Tab TAKE ONE TABLET BY MOUTH THREE TIMES DAILY AS NEEDED 90 Tablet 0   • Cinnamon 500 MG Cap Take 1,000 mg by mouth every day.     • albuterol 108 (90 Base) MCG/ACT Aero Soln inhalation aerosol Inhale 2 Puffs every 6 hours as needed for Shortness of Breath. 8.5 g 0   • losartan (COZAAR) 50 MG Tab Take 1 tablet by mouth every day. 90 tablet 2   • ibuprofen (MOTRIN) 800 MG Tab Take 1 tablet by mouth 1 time a day as needed for Mild Pain. 90 tablet 1   • metFORMIN ER (GLUCOPHAGE XR) 500 MG TABLET SR 24 HR Take 1 tablet by mouth every day. 90 tablet 3   • omeprazole (PRILOSEC) 20 MG delayed-release capsule TAKE ONE  "CAPSULE BY MOUTH ONE TIME DAILY 90 Cap 2   • carisoprodol (SOMA) 350 MG Tab Take 350 mg by mouth 4 times a day.     • Diclofenac Sodium (VOLTAREN) 1 % Gel Apply 4 g to skin as directed 2 Times a Day. 100 g 11   • ascorbic acid (ASCORBIC ACID) 500 MG Tab Take 500 mg by mouth every day.     • fluticasone (FLONASE) 50 MCG/ACT nasal spray Spray 1 Spray in nose every day. 2 Bottle 11   • Cholecalciferol (VITAMIN D PO) Take 2,000 mg by mouth every day.     • Calcium 1500 MG Tab Take 1 Tab by mouth every day.     • Glucosamine HCl (GLUCOSAMINE PO) Take 1 Tab by mouth every day.     • Omega-3 Fatty Acids (FISH OIL) 1000 MG Cap capsule Take 1,000 mg by mouth every day.     • Multiple Vitamins-Minerals (MULTIVITAMIN PO) Take 1 Tab by mouth every day.     • diclofenac sodium (VOLTAREN) 1 % Gel APPLY 4 G TO SKIN AS DIRECTED 2 TIMES A DAY. (Patient not taking: Reported on 11/8/2021) 100 g 3   • Diclofenac Sodium 1 % Gel APPLY 1 GRAM EXTERNALLY TO SKIN  OF AFFECTED AREA(S) FOUR TIMES DAILY (Patient not taking: Reported on 11/8/2021) 100 g 0     No current facility-administered medications for this visit.       Patient Active Problem List    Diagnosis Date Noted   • Right shoulder pain 07/15/2020   • Impingement syndrome of shoulder region, left 07/15/2020   • Cervical spondylosis 07/15/2020   • Dyslipidemia 07/15/2020   • Stage 1 chronic kidney disease 07/15/2020   • Reactive airway disease without complication 06/20/2019   • Chronic left shoulder pain 05/26/2017   • Prediabetes 04/01/2016   • Environmental allergies 01/28/2016   • Hypertension 01/28/2016   • Hyperlipidemia 01/28/2016   • Chronic neck pain 01/28/2016   • Vitamin D insufficiency 01/28/2016   • Depression 11/01/2013        Objective:   Temp 36.7 °C (98 °F) (Temporal)   Ht 1.518 m (4' 11.76\")   Wt 53.7 kg (118 lb 6.4 oz) Comment: Pt reported  BMI 23.31 kg/m²     Physical Exam:  Constitutional: Alert, no distress, well-groomed.  Skin: No rashes in visible " areas.  Eye: Round. Conjunctiva clear, lids normal. No icterus.   ENMT: Lips pink without lesions, good dentition, moist mucous membranes. Phonation normal.  Neck: No masses, no thyromegaly. Moves freely without pain.  Respiratory: Unlabored respiratory effort, no cough or audible wheeze  Psych: Alert and oriented x3, normal affect and mood.     Assessment and Plan:   The following treatment plan was discussed:     1. COVID  discussed Reg Cov, declines, ER precautions discussed, f/u with PCP    Follow-up: No follow-ups on file.

## 2021-11-17 ENCOUNTER — TELEMEDICINE (OUTPATIENT)
Dept: MEDICAL GROUP | Facility: MEDICAL CENTER | Age: 61
End: 2021-11-17
Payer: COMMERCIAL

## 2021-11-17 VITALS
SYSTOLIC BLOOD PRESSURE: 124 MMHG | BODY MASS INDEX: 23.36 KG/M2 | DIASTOLIC BLOOD PRESSURE: 87 MMHG | WEIGHT: 119 LBS | HEIGHT: 60 IN

## 2021-11-17 DIAGNOSIS — U07.1 COVID-19: ICD-10-CM

## 2021-11-17 PROCEDURE — 99214 OFFICE O/P EST MOD 30 MIN: CPT | Mod: 95,CS | Performed by: FAMILY MEDICINE

## 2021-11-17 RX ORDER — CEFDINIR 300 MG/1
300 CAPSULE ORAL 2 TIMES DAILY
Qty: 20 CAPSULE | Refills: 2 | Status: SHIPPED | OUTPATIENT
Start: 2021-11-17 | End: 2021-11-27

## 2021-11-17 RX ORDER — METHYLPREDNISOLONE 4 MG/1
TABLET ORAL
Qty: 21 TABLET | Refills: 2 | Status: SHIPPED | OUTPATIENT
Start: 2021-11-17

## 2021-11-17 NOTE — PROGRESS NOTES
Virtual Visit: Established Patient   This visit was conducted via Zoom using secure and encrypted videoconferencing technology.   The patient was in a private location in the state of [place].    The patient's identity was confirmed and verbal consent was obtained for this virtual visit.    Subjective:   CC:   Chief Complaint   Patient presents with   • Follow-Up     fmla covid Marilou Arias Reyes is a 61 y.o. female presenting for evaluation and management of:    FMLA paperwork/follow up recent covid infection       Current medicines (including changes today)  Current Outpatient Medications   Medication Sig Dispense Refill   • methylPREDNISolone (MEDROL DOSEPAK) 4 MG Tablet Therapy Pack As directed on the packaging label. 21 Tablet 2   • cefdinir (OMNICEF) 300 MG Cap Take 1 Capsule by mouth 2 times a day for 10 days. 20 Capsule 2   • cyclobenzaprine (FLEXERIL) 10 mg Tab TAKE ONE TABLET BY MOUTH THREE TIMES DAILY AS NEEDED 90 Tablet 0   • Cinnamon 500 MG Cap Take 1,000 mg by mouth every day.     • albuterol 108 (90 Base) MCG/ACT Aero Soln inhalation aerosol Inhale 2 Puffs every 6 hours as needed for Shortness of Breath. 8.5 g 0   • carisoprodol (SOMA) 350 MG Tab Take 350 mg by mouth 4 times a day.     • ascorbic acid (ASCORBIC ACID) 500 MG Tab Take 500 mg by mouth every day.     • Cholecalciferol (VITAMIN D PO) Take 2,000 mg by mouth every day.     • Calcium 1500 MG Tab Take 1 Tab by mouth every day.     • diclofenac sodium (VOLTAREN) 1 % Gel APPLY 4 G TO SKIN AS DIRECTED 2 TIMES A DAY. (Patient not taking: Reported on 11/8/2021) 100 g 3   • losartan (COZAAR) 50 MG Tab Take 1 tablet by mouth every day. 90 tablet 2   • ibuprofen (MOTRIN) 800 MG Tab Take 1 tablet by mouth 1 time a day as needed for Mild Pain. 90 tablet 1   • metFORMIN ER (GLUCOPHAGE XR) 500 MG TABLET SR 24 HR Take 1 tablet by mouth every day. 90 tablet 3   • omeprazole (PRILOSEC) 20 MG delayed-release capsule TAKE ONE CAPSULE BY MOUTH ONE TIME  "DAILY 90 Cap 2   • Diclofenac Sodium (VOLTAREN) 1 % Gel Apply 4 g to skin as directed 2 Times a Day. (Patient not taking: Reported on 11/17/2021) 100 g 11   • Diclofenac Sodium 1 % Gel APPLY 1 GRAM EXTERNALLY TO SKIN  OF AFFECTED AREA(S) FOUR TIMES DAILY (Patient not taking: Reported on 11/8/2021) 100 g 0   • fluticasone (FLONASE) 50 MCG/ACT nasal spray Spray 1 Spray in nose every day. 2 Bottle 11   • Glucosamine HCl (GLUCOSAMINE PO) Take 1 Tab by mouth every day.     • Omega-3 Fatty Acids (FISH OIL) 1000 MG Cap capsule Take 1,000 mg by mouth every day.     • Multiple Vitamins-Minerals (MULTIVITAMIN PO) Take 1 Tab by mouth every day.       No current facility-administered medications for this visit.       Patient Active Problem List    Diagnosis Date Noted   • COVID-19 11/17/2021   • Right shoulder pain 07/15/2020   • Impingement syndrome of shoulder region, left 07/15/2020   • Cervical spondylosis 07/15/2020   • Dyslipidemia 07/15/2020   • Stage 1 chronic kidney disease 07/15/2020   • Reactive airway disease without complication 06/20/2019   • Chronic left shoulder pain 05/26/2017   • Prediabetes 04/01/2016   • Environmental allergies 01/28/2016   • Hypertension 01/28/2016   • Hyperlipidemia 01/28/2016   • Chronic neck pain 01/28/2016   • Vitamin D insufficiency 01/28/2016   • Depression 11/01/2013        Objective:   /87 (BP Location: Left arm, Patient Position: Sitting, BP Cuff Size: Adult) Comment: pt stated  Ht 1.518 m (4' 11.75\") Comment: pt stated  Wt 54 kg (119 lb) Comment: pt stated  BMI 23.44 kg/m²     Physical Exam:  Constitutional: Alert, no distress, well-groomed.  Skin: No rashes in visible areas.  Eye: Round. Conjunctiva clear, lids normal. No icterus.   ENMT: Lips pink without lesions, good dentition, moist mucous membranes. Phonation normal.  Neck: No masses, no thyromegaly. Moves freely without pain.  Respiratory: Unlabored respiratory effort, no cough or audible wheeze  Psych: Alert and " oriented x3, normal affect and mood.     Assessment and Plan:   The following treatment plan was discussed:     1. COVID-19  - methylPREDNISolone (MEDROL DOSEPAK) 4 MG Tablet Therapy Pack; As directed on the packaging label.  Dispense: 21 Tablet; Refill: 2  - cefdinir (OMNICEF) 300 MG Cap; Take 1 Capsule by mouth 2 times a day for 10 days.  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      Extra time spent on FMLA paperwork   We fill this out for her current episode of work absence as well as for future intermittent leave as according to paperwork her employer [employer] requires FMLA for any absence longer than 3 days.  If she finds this to be helpful and wishes to todd annually.  I encourage her to schedule a visit for this annually     30+ min spent                  Relevant Medications    methylPREDNISolone (MEDROL DOSEPAK) 4 MG Tablet Therapy Pack    cefdinir (OMNICEF) 300 MG Cap            Follow-up: No follow-ups on file.

## 2021-11-17 NOTE — ASSESSMENT & PLAN NOTE
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      Extra time spent on FMLA paperwork   We fill this out for her current episode of work absence as well as for future intermittent leave as according to paperwork her employer [employer] requires FMLA for any absence longer than 3 days.  If she finds this to be helpful and wishes to todd annually.  I encourage her to schedule a visit for this annually     30+ min spent

## 2021-11-28 ENCOUNTER — PATIENT MESSAGE (OUTPATIENT)
Dept: MEDICAL GROUP | Facility: MEDICAL CENTER | Age: 61
End: 2021-11-28

## 2021-11-30 ENCOUNTER — TELEPHONE (OUTPATIENT)
Dept: MEDICAL GROUP | Facility: MEDICAL CENTER | Age: 61
End: 2021-11-30

## 2021-12-02 ENCOUNTER — TELEPHONE (OUTPATIENT)
Dept: MEDICAL GROUP | Facility: MEDICAL CENTER | Age: 61
End: 2021-12-02

## 2021-12-02 ENCOUNTER — PATIENT MESSAGE (OUTPATIENT)
Dept: MEDICAL GROUP | Facility: MEDICAL CENTER | Age: 61
End: 2021-12-02

## 2021-12-02 NOTE — TELEPHONE ENCOUNTER
----- Message from Jil Howe R.N. sent at 12/2/2021  2:00 PM PST -----  Regarding: FW: Medical Record   FYI  ----- Message -----  From: Marilou Arias Reyes  Sent: 12/2/2021  12:51 PM PST  To: Ralf Robbins Ma  Subject: Medical Record                                   Don’t even bother!! I need to talk to her soon about my concern and not 3 weeks from now .  I’m just really disappointed with your office !! I’m hoping I’ll get a review about your office and I’ll let them know about my dissatisfaction   Thank you

## 2021-12-27 DIAGNOSIS — I10 ESSENTIAL HYPERTENSION: ICD-10-CM

## 2021-12-28 RX ORDER — LOSARTAN POTASSIUM 50 MG/1
TABLET ORAL
Qty: 90 TABLET | Refills: 2 | Status: SHIPPED | OUTPATIENT
Start: 2021-12-28 | End: 2022-05-19 | Stop reason: SDUPTHER

## 2022-04-12 NOTE — TELEPHONE ENCOUNTER
Was the patient seen in the last year in this department? Yes Lov Tramadol 3/26/18 CARISOPRODOL 350 MG TABLET 3/12/18    3/6/18  Does patient have an active prescription for medications requested? No     Received Request Via: Pharmacy   OB/GYN

## 2022-04-22 DIAGNOSIS — G89.29 CHRONIC PAIN OF BOTH SHOULDERS: ICD-10-CM

## 2022-04-22 DIAGNOSIS — M25.512 CHRONIC PAIN OF BOTH SHOULDERS: ICD-10-CM

## 2022-04-22 DIAGNOSIS — M25.511 CHRONIC PAIN OF BOTH SHOULDERS: ICD-10-CM

## 2022-04-29 RX ORDER — CYCLOBENZAPRINE HCL 10 MG
10 TABLET ORAL 3 TIMES DAILY PRN
Qty: 90 TABLET | Refills: 0 | OUTPATIENT
Start: 2022-04-29

## 2022-05-03 ENCOUNTER — HOSPITAL ENCOUNTER (OUTPATIENT)
Dept: LAB | Facility: MEDICAL CENTER | Age: 62
End: 2022-05-03
Attending: FAMILY MEDICINE
Payer: COMMERCIAL

## 2022-05-03 DIAGNOSIS — R73.03 PREDIABETES: ICD-10-CM

## 2022-05-03 DIAGNOSIS — I15.9 SECONDARY HYPERTENSION: ICD-10-CM

## 2022-05-03 DIAGNOSIS — E78.5 HYPERLIPIDEMIA, UNSPECIFIED HYPERLIPIDEMIA TYPE: ICD-10-CM

## 2022-05-03 LAB
ANION GAP SERPL CALC-SCNC: 12 MMOL/L (ref 7–16)
BUN SERPL-MCNC: 13 MG/DL (ref 8–22)
CALCIUM SERPL-MCNC: 9.5 MG/DL (ref 8.5–10.5)
CHLORIDE SERPL-SCNC: 101 MMOL/L (ref 96–112)
CHOLEST SERPL-MCNC: 216 MG/DL (ref 100–199)
CO2 SERPL-SCNC: 26 MMOL/L (ref 20–33)
CREAT SERPL-MCNC: 0.49 MG/DL (ref 0.5–1.4)
CREAT UR-MCNC: 98.06 MG/DL
EST. AVERAGE GLUCOSE BLD GHB EST-MCNC: 131 MG/DL
FASTING STATUS PATIENT QL REPORTED: NORMAL
GFR SERPLBLD CREATININE-BSD FMLA CKD-EPI: 107 ML/MIN/1.73 M 2
GLUCOSE SERPL-MCNC: 105 MG/DL (ref 65–99)
HBA1C MFR BLD: 6.2 % (ref 4–5.6)
HDLC SERPL-MCNC: 49 MG/DL
LDLC SERPL CALC-MCNC: 112 MG/DL
MICROALBUMIN UR-MCNC: 1.4 MG/DL
MICROALBUMIN/CREAT UR: 14 MG/G (ref 0–30)
POTASSIUM SERPL-SCNC: 4.1 MMOL/L (ref 3.6–5.5)
SODIUM SERPL-SCNC: 139 MMOL/L (ref 135–145)
TRIGL SERPL-MCNC: 276 MG/DL (ref 0–149)

## 2022-05-03 PROCEDURE — 82570 ASSAY OF URINE CREATININE: CPT

## 2022-05-03 PROCEDURE — 80061 LIPID PANEL: CPT

## 2022-05-03 PROCEDURE — 83036 HEMOGLOBIN GLYCOSYLATED A1C: CPT

## 2022-05-03 PROCEDURE — 80048 BASIC METABOLIC PNL TOTAL CA: CPT

## 2022-05-03 PROCEDURE — 82043 UR ALBUMIN QUANTITATIVE: CPT

## 2022-05-03 PROCEDURE — 36415 COLL VENOUS BLD VENIPUNCTURE: CPT

## 2022-05-04 LAB — LDLC SERPL-MCNC: 81 MG/DL (ref 0–129)

## 2022-05-19 ENCOUNTER — OFFICE VISIT (OUTPATIENT)
Dept: MEDICAL GROUP | Facility: MEDICAL CENTER | Age: 62
End: 2022-05-19
Payer: COMMERCIAL

## 2022-05-19 VITALS
DIASTOLIC BLOOD PRESSURE: 64 MMHG | WEIGHT: 121.25 LBS | SYSTOLIC BLOOD PRESSURE: 108 MMHG | BODY MASS INDEX: 23.81 KG/M2 | HEART RATE: 90 BPM | HEIGHT: 60 IN | OXYGEN SATURATION: 94 % | RESPIRATION RATE: 16 BRPM | TEMPERATURE: 98.2 F

## 2022-05-19 DIAGNOSIS — E11.9 TYPE 2 DIABETES MELLITUS WITHOUT COMPLICATION, WITHOUT LONG-TERM CURRENT USE OF INSULIN (HCC): ICD-10-CM

## 2022-05-19 DIAGNOSIS — M54.9 BACK PAIN, UNSPECIFIED BACK LOCATION, UNSPECIFIED BACK PAIN LATERALITY, UNSPECIFIED CHRONICITY: ICD-10-CM

## 2022-05-19 DIAGNOSIS — M25.511 CHRONIC PAIN OF BOTH SHOULDERS: ICD-10-CM

## 2022-05-19 DIAGNOSIS — G89.29 CHRONIC LEFT SHOULDER PAIN: ICD-10-CM

## 2022-05-19 DIAGNOSIS — G89.29 CHRONIC PAIN OF BOTH SHOULDERS: ICD-10-CM

## 2022-05-19 DIAGNOSIS — M25.512 CHRONIC LEFT SHOULDER PAIN: ICD-10-CM

## 2022-05-19 DIAGNOSIS — I10 ESSENTIAL HYPERTENSION: ICD-10-CM

## 2022-05-19 DIAGNOSIS — M25.512 CHRONIC PAIN OF BOTH SHOULDERS: ICD-10-CM

## 2022-05-19 PROCEDURE — 99214 OFFICE O/P EST MOD 30 MIN: CPT | Performed by: FAMILY MEDICINE

## 2022-05-19 RX ORDER — METFORMIN HYDROCHLORIDE 500 MG/1
500 TABLET, EXTENDED RELEASE ORAL DAILY
Qty: 90 TABLET | Refills: 3 | Status: SHIPPED | OUTPATIENT
Start: 2022-05-19

## 2022-05-19 RX ORDER — CYCLOBENZAPRINE HCL 10 MG
10 TABLET ORAL 3 TIMES DAILY PRN
Qty: 43 TABLET | Refills: 0 | Status: SHIPPED | OUTPATIENT
Start: 2022-05-19 | End: 2022-06-02

## 2022-05-19 RX ORDER — LOSARTAN POTASSIUM 50 MG/1
50 TABLET ORAL DAILY
Qty: 90 TABLET | Refills: 2 | Status: SHIPPED | OUTPATIENT
Start: 2022-05-19

## 2022-05-19 RX ORDER — IBUPROFEN 800 MG/1
800 TABLET ORAL EVERY 8 HOURS PRN
Qty: 53 TABLET | Refills: 0 | Status: SHIPPED | OUTPATIENT
Start: 2022-05-19 | End: 2022-06-02

## 2022-05-19 RX ORDER — CARISOPRODOL 350 MG/1
350 TABLET ORAL 4 TIMES DAILY
Qty: 56 TABLET | Refills: 0 | Status: SHIPPED | OUTPATIENT
Start: 2022-05-19 | End: 2022-06-02

## 2022-05-19 NOTE — PROGRESS NOTES
This medical record contains text that has been entered with the assistance of computer voice recognition and dictation software.  Therefore, it may contain unintended errors in text, spelling, punctuation, or grammar        Chief Complaint   Patient presents with   • Annual Exam     Pt would like annual exam and would like x-rays of both shoulders for chronic pain, thinks is related to arthritis       Marilou Arias Reyes is a 61 y.o. 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 90 Tablet 2   • cyclobenzaprine (FLEXERIL) 10 mg Tab Take 1 Tablet by mouth 3 times a day as needed for Muscle Spasms for up to 14 days. 43 Tablet 0   • carisoprodol (SOMA) 350 MG Tab Take 1 Tablet by mouth 4 times a day for 14 days. 56 Tablet 0   • ibuprofen (MOTRIN) 800 MG Tab Take 1 Tablet by mouth every 8 hours as needed for Mild Pain for up to 14 days. 53 Tablet 0   • metFORMIN ER (GLUCOPHAGE XR) 500 MG TABLET SR 24 HR Take 1 Tablet by mouth every day. 90 Tablet 3   • Cinnamon 500 MG Cap Take 1,000 mg by mouth 2 times a day.     • albuterol 108 (90 Base) MCG/ACT Aero Soln inhalation aerosol Inhale 2 Puffs every 6 hours as needed for Shortness of Breath. 8.5 g 0   • omeprazole (PRILOSEC) 20 MG delayed-release capsule TAKE ONE CAPSULE BY MOUTH ONE TIME DAILY 90 Cap 2   • Diclofenac Sodium (VOLTAREN) 1 % Gel Apply 4 g to skin as directed 2 Times a Day. 100 g 11   • ascorbic acid (ASCORBIC ACID) 500 MG Tab Take 500 mg by mouth every day.     • fluticasone (FLONASE) 50 MCG/ACT nasal spray Spray 1 Spray in nose every day. 2 Bottle 11   • Cholecalciferol (VITAMIN D PO) Take 2,000 mg by mouth every day.     • Calcium 1500 MG Tab Take 1 Tab by mouth every day.     • Glucosamine HCl (GLUCOSAMINE PO) Take 1 Tab by  "mouth every day.     • Omega-3 Fatty Acids (FISH OIL) 1000 MG Cap capsule Take 1,000 mg by mouth every day.     • Multiple Vitamins-Minerals (MULTIVITAMIN PO) Take 1 Tab by mouth every day.     • methylPREDNISolone (MEDROL DOSEPAK) 4 MG Tablet Therapy Pack As directed on the packaging label. (Patient not taking: Reported on 5/19/2022) 21 Tablet 2     No current facility-administered medications for this visit.     Patient Active Problem List    Diagnosis Date Noted   • COVID-19 11/17/2021   • Right shoulder pain 07/15/2020   • Impingement syndrome of shoulder region, left 07/15/2020   • Cervical spondylosis 07/15/2020   • Dyslipidemia 07/15/2020   • Stage 1 chronic kidney disease 07/15/2020   • Reactive airway disease without complication 06/20/2019   • Chronic left shoulder pain 05/26/2017   • Diabetes (HCC) 04/01/2016   • Environmental allergies 01/28/2016   • Hypertension 01/28/2016   • Hyperlipidemia 01/28/2016   • Chronic neck pain 01/28/2016   • Vitamin D insufficiency 01/28/2016   • Depression 11/01/2013     Past Surgical History:   Procedure Laterality Date   • CHOLECYSTECTOMY     • PRIMARY C SECTION        Social History     Tobacco Use   • Smoking status: Never Smoker   • Smokeless tobacco: Never Used   Vaping Use   • Vaping Use: Never used   Substance Use Topics   • Alcohol use: Yes     Comment: Rare   • Drug use: No     Family History   Problem Relation Age of Onset   • Lung Disease Mother         COPD   • Diabetes Mother    • Hypertension Mother    • Heart Disease Father    • Hypertension Father    • Cancer Sister         Breast   • Cancer Brother         Colon, prostate   • Alcohol/Drug Neg Hx    • Stroke Neg Hx            ROS    all review of system completed and negative except for those listed above     Objective:     /64 (BP Location: Left arm, Patient Position: Sitting, BP Cuff Size: Adult)   Pulse 90   Temp 36.8 °C (98.2 °F) (Temporal)   Resp 16   Ht 1.518 m (4' 11.75\")   Wt 55 kg (121 " lb 4.1 oz)   SpO2 94%  Body mass index is 23.88 kg/m².  Physical Exam:    Constitutional: Alert, no distress.  Skin: Warm, dry, good turgor, no rashes in visible areas.  Eye: Equal, round and reactive, conjunctiva clear, lids normal.  ENMT: Lips without lesions, good dentition, oropharynx clear.  Neck: Trachea midline, no masses, no thyromegaly. No cervical or supraclavicular lymphadenopathy.  Respiratory: Unlabored respiratory effort, lungs clear to auscultation, no wheezes, no ronchi.  Cardiovascular: Normal S1, S2, no murmur, no edema.  Abdomen: Soft, non-tender, no masses, no hepatosplenomegaly.  Psych: Alert and oriented x3, normal affect and mood.          Assessment and Plan:   The following treatment plan was discussed        Problem List Items Addressed This Visit     Diabetes (HCC)     Tolerating metformin   Follow up 1 mo dm rn md visit  Will obtain retina exam   Labs reviewed   Vaccines discussed              Relevant Medications    metFORMIN ER (GLUCOPHAGE XR) 500 MG TABLET SR 24 HR    Other Relevant Orders    HEMOGLOBIN A1C    Basic Metabolic Panel    Lipid Profile    MICROALBUMIN CREAT RATIO URINE    Chronic left shoulder pain     Did see sports med for l shoulder   We review that consult   Now having bilateral shoulder pain   I explain that we need to evaluate for cervical radicular problems as well as primary shoulder problems   Her L shoulder responded excellently to the steroid injection with MSK        Follow up with MSK and also consult with physiatry regarding my suspicion for cspine            Relevant Medications    cyclobenzaprine (FLEXERIL) 10 mg Tab    carisoprodol (SOMA) 350 MG Tab    ibuprofen (MOTRIN) 800 MG Tab      Other Visit Diagnoses     Essential hypertension        Relevant Medications    losartan (COZAAR) 50 MG Tab    Chronic pain of both shoulders        Relevant Medications    cyclobenzaprine (FLEXERIL) 10 mg Tab    carisoprodol (SOMA) 350 MG Tab    ibuprofen (MOTRIN) 800 MG  Tab    Other Relevant Orders    Referral to Sports Medicine    DX-SHOULDER 2+ LEFT    DX-CERVICAL SPINE-2 OR 3 VIEWS    DX-SHOULDER 2+ RIGHT    Referral to Pain Clinic    Back pain, unspecified back location, unspecified back pain laterality, unspecified chronicity        Relevant Medications    cyclobenzaprine (FLEXERIL) 10 mg Tab    carisoprodol (SOMA) 350 MG Tab    ibuprofen (MOTRIN) 800 MG Tab                Instructed to follow up if symptoms worsen or fail to improve, ER/UC precautions discussed as well    Geno Benitez MD  [facility]   [address] [place] [address]   [place] [address]  Phone: [phone]

## 2022-05-19 NOTE — ASSESSMENT & PLAN NOTE
Did see sports med for l shoulder   We review that consult   Now having bilateral shoulder pain   I explain that we need to evaluate for cervical radicular problems as well as primary shoulder problems   Her L shoulder responded excellently to the steroid injection with MSK        Follow up with MSK and also consult with physiatry regarding my suspicion for cspine

## 2022-05-19 NOTE — LETTER
[facility] hospital  Geno Benitez M.D.  [address]  [place] [address]  Fax: [phone]   Authorization for Release/Disclosure of   Protected Health Information   Name: MARILOU ARIAS REYES : 1960 SSN: [id number]   Address: [address] [place] [address] [place] [address] Phone:    [phone] (home) [phone] (work)   I authorize the entity listed below to release/disclose the PHI below to:   Critical access hospital/Geno Benitez M.D. and Geno Benitez M.D.   Provider or Entity Name:     Address   City, State, Zip   Phone:      Fax:     Reason for request: continuity of care   Information to be released:    [  ] LAST COLONOSCOPY,  including any PATH REPORT and follow-up  [  ] LAST FIT/COLOGUARD RESULT [  ] LAST DEXA  [  ] LAST MAMMOGRAM  [  ] LAST PAP  [  ] LAST LABS [  ] RETINA EXAM REPORT  [  ] IMMUNIZATION RECORDS  [  ] Release all info      [  ] Check here and initial the line next to each item to release ALL health information INCLUDING  _____ Care and treatment for drug and / or alcohol abuse  _____ HIV testing, infection status, or AIDS  _____ Genetic Testing    DATES OF SERVICE OR TIME PERIOD TO BE DISCLOSED: _____________  I understand and acknowledge that:  * This Authorization may be revoked at any time by you in writing, except if your health information has already been used or disclosed.  * Your health information that will be used or disclosed as a result of you signing this authorization could be re-disclosed by the recipient. If this occurs, your re-disclosed health information may no longer be protected by State or Federal laws.  * You may refuse to sign this Authorization. Your refusal will not affect your ability to obtain treatment.  * This Authorization becomes effective upon signing and will  on (date) __________.      If no date is indicated, this Authorization will  one (1) year from the signature date.    Name: Marilou Arias Reyes    Signature:   Date:     2022        PLEASE FAX REQUESTED RECORDS BACK TO: [phone]

## 2022-05-19 NOTE — ASSESSMENT & PLAN NOTE
Tolerating metformin   Follow up 1 mo dm rn md visit  Will obtain retina exam   Labs reviewed   Vaccines discussed

## 2022-05-23 ENCOUNTER — HOSPITAL ENCOUNTER (OUTPATIENT)
Dept: RADIOLOGY | Facility: MEDICAL CENTER | Age: 62
End: 2022-05-23
Attending: FAMILY MEDICINE
Payer: COMMERCIAL

## 2022-05-23 DIAGNOSIS — M25.512 CHRONIC PAIN OF BOTH SHOULDERS: ICD-10-CM

## 2022-05-23 DIAGNOSIS — M25.511 CHRONIC PAIN OF BOTH SHOULDERS: ICD-10-CM

## 2022-05-23 DIAGNOSIS — G89.29 CHRONIC PAIN OF BOTH SHOULDERS: ICD-10-CM

## 2022-05-23 PROCEDURE — 73030 X-RAY EXAM OF SHOULDER: CPT | Mod: LT

## 2022-05-23 PROCEDURE — 73030 X-RAY EXAM OF SHOULDER: CPT | Mod: RT

## 2022-05-23 PROCEDURE — 72040 X-RAY EXAM NECK SPINE 2-3 VW: CPT

## 2022-07-03 NOTE — TELEPHONE ENCOUNTER
Received request via: Pharmacy    Was the patient seen in the last year in this department? Yes    Does the patient have an active prescription (recently filled or refills available) for medication(s) requested? No     Future Appointments       Provider Department Center    7/14/2022 10:30 AM (Arrive by 10:15 AM) Geno Benitez M.D.; [facility] DIABETES RN [facility]

## 2022-08-10 DIAGNOSIS — M25.511 CHRONIC PAIN OF BOTH SHOULDERS: ICD-10-CM

## 2022-08-10 DIAGNOSIS — M25.512 CHRONIC PAIN OF BOTH SHOULDERS: ICD-10-CM

## 2022-08-10 DIAGNOSIS — M54.9 BACK PAIN, UNSPECIFIED BACK LOCATION, UNSPECIFIED BACK PAIN LATERALITY, UNSPECIFIED CHRONICITY: ICD-10-CM

## 2022-08-10 DIAGNOSIS — G89.29 CHRONIC PAIN OF BOTH SHOULDERS: ICD-10-CM

## 2022-08-11 RX ORDER — OMEPRAZOLE 20 MG/1
CAPSULE, DELAYED RELEASE ORAL
Qty: 90 CAPSULE | Refills: 2 | Status: SHIPPED | OUTPATIENT
Start: 2022-08-11

## 2023-05-17 ENCOUNTER — HOSPITAL ENCOUNTER (OUTPATIENT)
Dept: LAB | Facility: MEDICAL CENTER | Age: 63
End: 2023-05-17
Attending: FAMILY MEDICINE
Payer: COMMERCIAL

## 2023-05-17 LAB
ALBUMIN SERPL BCP-MCNC: 4.6 G/DL (ref 3.2–4.9)
ALBUMIN/GLOB SERPL: 1.6 G/DL
ALP SERPL-CCNC: 52 U/L (ref 30–99)
ALT SERPL-CCNC: 17 U/L (ref 2–50)
ANION GAP SERPL CALC-SCNC: 13 MMOL/L (ref 7–16)
AST SERPL-CCNC: 18 U/L (ref 12–45)
BASOPHILS # BLD AUTO: 0.7 % (ref 0–1.8)
BASOPHILS # BLD: 0.04 K/UL (ref 0–0.12)
BILIRUB SERPL-MCNC: 0.3 MG/DL (ref 0.1–1.5)
BUN SERPL-MCNC: 19 MG/DL (ref 8–22)
CALCIUM ALBUM COR SERPL-MCNC: 9 MG/DL (ref 8.5–10.5)
CALCIUM SERPL-MCNC: 9.5 MG/DL (ref 8.5–10.5)
CHLORIDE SERPL-SCNC: 105 MMOL/L (ref 96–112)
CHOLEST SERPL-MCNC: 207 MG/DL (ref 100–199)
CO2 SERPL-SCNC: 26 MMOL/L (ref 20–33)
CREAT SERPL-MCNC: 0.58 MG/DL (ref 0.5–1.4)
CREAT UR-MCNC: 80.14 MG/DL
CREAT UR-MCNC: 80.66 MG/DL
EOSINOPHIL # BLD AUTO: 0.17 K/UL (ref 0–0.51)
EOSINOPHIL NFR BLD: 3.1 % (ref 0–6.9)
ERYTHROCYTE [DISTWIDTH] IN BLOOD BY AUTOMATED COUNT: 40.9 FL (ref 35.9–50)
EST. AVERAGE GLUCOSE BLD GHB EST-MCNC: 134 MG/DL
FASTING STATUS PATIENT QL REPORTED: NORMAL
GFR SERPLBLD CREATININE-BSD FMLA CKD-EPI: 103 ML/MIN/1.73 M 2
GLOBULIN SER CALC-MCNC: 2.9 G/DL (ref 1.9–3.5)
GLUCOSE SERPL-MCNC: 102 MG/DL (ref 65–99)
HBA1C MFR BLD: 6.3 % (ref 4–5.6)
HCT VFR BLD AUTO: 41.7 % (ref 37–47)
HDLC SERPL-MCNC: 55 MG/DL
HGB BLD-MCNC: 14.2 G/DL (ref 12–16)
IMM GRANULOCYTES # BLD AUTO: 0.01 K/UL (ref 0–0.11)
IMM GRANULOCYTES NFR BLD AUTO: 0.2 % (ref 0–0.9)
LDLC SERPL CALC-MCNC: 92 MG/DL
LYMPHOCYTES # BLD AUTO: 2.09 K/UL (ref 1–4.8)
LYMPHOCYTES NFR BLD: 37.7 % (ref 22–41)
MCH RBC QN AUTO: 31.1 PG (ref 27–33)
MCHC RBC AUTO-ENTMCNC: 34.1 G/DL (ref 33.6–35)
MCV RBC AUTO: 91.4 FL (ref 81.4–97.8)
MICROALBUMIN UR-MCNC: <1.2 MG/DL
MICROALBUMIN/CREAT UR: NORMAL MG/G (ref 0–30)
MONOCYTES # BLD AUTO: 0.33 K/UL (ref 0–0.85)
MONOCYTES NFR BLD AUTO: 5.9 % (ref 0–13.4)
NEUTROPHILS # BLD AUTO: 2.91 K/UL (ref 2–7.15)
NEUTROPHILS NFR BLD: 52.4 % (ref 44–72)
NRBC # BLD AUTO: 0 K/UL
NRBC BLD-RTO: 0 /100 WBC
PLATELET # BLD AUTO: 246 K/UL (ref 164–446)
PMV BLD AUTO: 11 FL (ref 9–12.9)
POTASSIUM SERPL-SCNC: 4.5 MMOL/L (ref 3.6–5.5)
PROT SERPL-MCNC: 7.5 G/DL (ref 6–8.2)
RBC # BLD AUTO: 4.56 M/UL (ref 4.2–5.4)
SODIUM SERPL-SCNC: 144 MMOL/L (ref 135–145)
TRIGL SERPL-MCNC: 300 MG/DL (ref 0–149)
WBC # BLD AUTO: 5.6 K/UL (ref 4.8–10.8)

## 2023-05-17 PROCEDURE — 84443 ASSAY THYROID STIM HORMONE: CPT

## 2023-05-17 PROCEDURE — 85025 COMPLETE CBC W/AUTO DIFF WBC: CPT

## 2023-05-17 PROCEDURE — 82570 ASSAY OF URINE CREATININE: CPT

## 2023-05-17 PROCEDURE — 80053 COMPREHEN METABOLIC PANEL: CPT

## 2023-05-17 PROCEDURE — 36415 COLL VENOUS BLD VENIPUNCTURE: CPT

## 2023-05-17 PROCEDURE — 82306 VITAMIN D 25 HYDROXY: CPT

## 2023-05-17 PROCEDURE — 83036 HEMOGLOBIN GLYCOSYLATED A1C: CPT

## 2023-05-17 PROCEDURE — 80061 LIPID PANEL: CPT

## 2023-05-17 PROCEDURE — 82043 UR ALBUMIN QUANTITATIVE: CPT

## 2023-05-18 LAB
25(OH)D3 SERPL-MCNC: 53 NG/ML (ref 30–100)
TSH SERPL DL<=0.005 MIU/L-ACNC: 1.45 UIU/ML (ref 0.38–5.33)

## 2023-11-15 ENCOUNTER — HOSPITAL ENCOUNTER (OUTPATIENT)
Dept: LAB | Facility: MEDICAL CENTER | Age: 63
End: 2023-11-15
Attending: FAMILY MEDICINE
Payer: COMMERCIAL

## 2023-11-15 LAB
ALBUMIN SERPL BCP-MCNC: 4.5 G/DL (ref 3.2–4.9)
ALBUMIN/GLOB SERPL: 1.7 G/DL
ALP SERPL-CCNC: 62 U/L (ref 30–99)
ALT SERPL-CCNC: 29 U/L (ref 2–50)
ANION GAP SERPL CALC-SCNC: 11 MMOL/L (ref 7–16)
AST SERPL-CCNC: 42 U/L (ref 12–45)
BILIRUB SERPL-MCNC: 0.2 MG/DL (ref 0.1–1.5)
BUN SERPL-MCNC: 19 MG/DL (ref 8–22)
CALCIUM ALBUM COR SERPL-MCNC: 8.9 MG/DL (ref 8.5–10.5)
CALCIUM SERPL-MCNC: 9.3 MG/DL (ref 8.5–10.5)
CHLORIDE SERPL-SCNC: 103 MMOL/L (ref 96–112)
CHOLEST SERPL-MCNC: 219 MG/DL (ref 100–199)
CO2 SERPL-SCNC: 27 MMOL/L (ref 20–33)
CREAT SERPL-MCNC: 0.61 MG/DL (ref 0.5–1.4)
EST. AVERAGE GLUCOSE BLD GHB EST-MCNC: 128 MG/DL
FASTING STATUS PATIENT QL REPORTED: NORMAL
GFR SERPLBLD CREATININE-BSD FMLA CKD-EPI: 101 ML/MIN/1.73 M 2
GLOBULIN SER CALC-MCNC: 2.7 G/DL (ref 1.9–3.5)
GLUCOSE SERPL-MCNC: 99 MG/DL (ref 65–99)
HBA1C MFR BLD: 6.1 % (ref 4–5.6)
HDLC SERPL-MCNC: 44 MG/DL
LDLC SERPL CALC-MCNC: ABNORMAL MG/DL
POTASSIUM SERPL-SCNC: 4.2 MMOL/L (ref 3.6–5.5)
PROT SERPL-MCNC: 7.2 G/DL (ref 6–8.2)
SODIUM SERPL-SCNC: 141 MMOL/L (ref 135–145)
TRIGL SERPL-MCNC: 405 MG/DL (ref 0–149)

## 2023-11-15 PROCEDURE — 36415 COLL VENOUS BLD VENIPUNCTURE: CPT

## 2023-11-15 PROCEDURE — 80061 LIPID PANEL: CPT

## 2023-11-15 PROCEDURE — 80053 COMPREHEN METABOLIC PANEL: CPT

## 2023-11-15 PROCEDURE — 83036 HEMOGLOBIN GLYCOSYLATED A1C: CPT

## 2024-05-09 ENCOUNTER — HOSPITAL ENCOUNTER (OUTPATIENT)
Dept: LAB | Facility: MEDICAL CENTER | Age: 64
End: 2024-05-09
Attending: FAMILY MEDICINE
Payer: COMMERCIAL

## 2024-05-09 LAB
ALBUMIN SERPL BCP-MCNC: 4.8 G/DL (ref 3.2–4.9)
ALBUMIN/GLOB SERPL: 1.8 G/DL
ALP SERPL-CCNC: 62 U/L (ref 30–99)
ALT SERPL-CCNC: 40 U/L (ref 2–50)
ANION GAP SERPL CALC-SCNC: 12 MMOL/L (ref 7–16)
AST SERPL-CCNC: 30 U/L (ref 12–45)
BILIRUB SERPL-MCNC: 0.3 MG/DL (ref 0.1–1.5)
BUN SERPL-MCNC: 19 MG/DL (ref 8–22)
CALCIUM ALBUM COR SERPL-MCNC: 8.8 MG/DL (ref 8.5–10.5)
CALCIUM SERPL-MCNC: 9.4 MG/DL (ref 8.5–10.5)
CHLORIDE SERPL-SCNC: 101 MMOL/L (ref 96–112)
CHOLEST SERPL-MCNC: 224 MG/DL (ref 100–199)
CO2 SERPL-SCNC: 27 MMOL/L (ref 20–33)
CREAT SERPL-MCNC: 0.56 MG/DL (ref 0.5–1.4)
CREAT UR-MCNC: 165.4 MG/DL
EST. AVERAGE GLUCOSE BLD GHB EST-MCNC: 134 MG/DL
FASTING STATUS PATIENT QL REPORTED: NORMAL
GFR SERPLBLD CREATININE-BSD FMLA CKD-EPI: 103 ML/MIN/1.73 M 2
GLOBULIN SER CALC-MCNC: 2.6 G/DL (ref 1.9–3.5)
GLUCOSE SERPL-MCNC: 101 MG/DL (ref 65–99)
HBA1C MFR BLD: 6.3 % (ref 4–5.6)
HDLC SERPL-MCNC: 50 MG/DL
LDLC SERPL CALC-MCNC: 102 MG/DL
MICROALBUMIN UR-MCNC: 4.7 MG/DL
MICROALBUMIN/CREAT UR: 28 MG/G (ref 0–30)
POTASSIUM SERPL-SCNC: 3.9 MMOL/L (ref 3.6–5.5)
PROT SERPL-MCNC: 7.4 G/DL (ref 6–8.2)
SODIUM SERPL-SCNC: 140 MMOL/L (ref 135–145)
TRIGL SERPL-MCNC: 360 MG/DL (ref 0–149)

## 2024-11-09 ENCOUNTER — HOSPITAL ENCOUNTER (OUTPATIENT)
Dept: LAB | Facility: MEDICAL CENTER | Age: 64
End: 2024-11-09
Attending: FAMILY MEDICINE
Payer: COMMERCIAL

## 2024-11-09 LAB
ALBUMIN SERPL BCP-MCNC: 4.5 G/DL (ref 3.2–4.9)
ALBUMIN/GLOB SERPL: 1.5 G/DL
ALP SERPL-CCNC: 58 U/L (ref 30–99)
ALT SERPL-CCNC: 37 U/L (ref 2–50)
ANION GAP SERPL CALC-SCNC: 10 MMOL/L (ref 7–16)
AST SERPL-CCNC: 28 U/L (ref 12–45)
BILIRUB SERPL-MCNC: 0.3 MG/DL (ref 0.1–1.5)
BUN SERPL-MCNC: 22 MG/DL (ref 8–22)
CALCIUM ALBUM COR SERPL-MCNC: 9.1 MG/DL (ref 8.5–10.5)
CALCIUM SERPL-MCNC: 9.5 MG/DL (ref 8.5–10.5)
CHLORIDE SERPL-SCNC: 105 MMOL/L (ref 96–112)
CHOLEST SERPL-MCNC: 198 MG/DL (ref 100–199)
CO2 SERPL-SCNC: 27 MMOL/L (ref 20–33)
CREAT SERPL-MCNC: 0.63 MG/DL (ref 0.5–1.4)
EST. AVERAGE GLUCOSE BLD GHB EST-MCNC: 128 MG/DL
FASTING STATUS PATIENT QL REPORTED: NORMAL
GFR SERPLBLD CREATININE-BSD FMLA CKD-EPI: 100 ML/MIN/1.73 M 2
GLOBULIN SER CALC-MCNC: 3.1 G/DL (ref 1.9–3.5)
GLUCOSE SERPL-MCNC: 103 MG/DL (ref 65–99)
HBA1C MFR BLD: 6.1 % (ref 4–5.6)
HDLC SERPL-MCNC: 49 MG/DL
LDLC SERPL CALC-MCNC: 78 MG/DL
POTASSIUM SERPL-SCNC: 4.7 MMOL/L (ref 3.6–5.5)
PROT SERPL-MCNC: 7.6 G/DL (ref 6–8.2)
SODIUM SERPL-SCNC: 142 MMOL/L (ref 135–145)
TRIGL SERPL-MCNC: 355 MG/DL (ref 0–149)

## 2024-11-09 PROCEDURE — 83036 HEMOGLOBIN GLYCOSYLATED A1C: CPT

## 2024-11-09 PROCEDURE — 36415 COLL VENOUS BLD VENIPUNCTURE: CPT

## 2024-11-09 PROCEDURE — 80053 COMPREHEN METABOLIC PANEL: CPT

## 2024-11-09 PROCEDURE — 80061 LIPID PANEL: CPT

## 2025-05-24 ENCOUNTER — HOSPITAL ENCOUNTER (OUTPATIENT)
Dept: LAB | Facility: MEDICAL CENTER | Age: 65
End: 2025-05-24
Attending: FAMILY MEDICINE
Payer: COMMERCIAL

## 2025-05-24 ENCOUNTER — APPOINTMENT (OUTPATIENT)
Dept: LAB | Facility: MEDICAL CENTER | Age: 65
End: 2025-05-24
Payer: COMMERCIAL

## 2025-05-24 LAB
ALBUMIN SERPL BCP-MCNC: 4.5 G/DL (ref 3.2–4.9)
ALBUMIN/GLOB SERPL: 1.5 G/DL
ALP SERPL-CCNC: 64 U/L (ref 30–99)
ALT SERPL-CCNC: 56 U/L (ref 2–50)
ANION GAP SERPL CALC-SCNC: 15 MMOL/L (ref 7–16)
AST SERPL-CCNC: 39 U/L (ref 12–45)
BILIRUB SERPL-MCNC: 0.3 MG/DL (ref 0.1–1.5)
BUN SERPL-MCNC: 15 MG/DL (ref 8–22)
CALCIUM ALBUM COR SERPL-MCNC: 9.2 MG/DL (ref 8.5–10.5)
CALCIUM SERPL-MCNC: 9.6 MG/DL (ref 8.5–10.5)
CHLORIDE SERPL-SCNC: 102 MMOL/L (ref 96–112)
CHOLEST SERPL-MCNC: 280 MG/DL (ref 100–199)
CO2 SERPL-SCNC: 25 MMOL/L (ref 20–33)
CREAT SERPL-MCNC: 0.71 MG/DL (ref 0.5–1.4)
CREAT UR-MCNC: 190 MG/DL
EST. AVERAGE GLUCOSE BLD GHB EST-MCNC: 140 MG/DL
GFR SERPLBLD CREATININE-BSD FMLA CKD-EPI: 95 ML/MIN/1.73 M 2
GLOBULIN SER CALC-MCNC: 3.1 G/DL (ref 1.9–3.5)
GLUCOSE SERPL-MCNC: 112 MG/DL (ref 65–99)
HBA1C MFR BLD: 6.5 % (ref 4–5.6)
HDLC SERPL-MCNC: 54 MG/DL
LDLC SERPL CALC-MCNC: 147 MG/DL
MICROALBUMIN UR-MCNC: 3.4 MG/DL
MICROALBUMIN/CREAT UR: 18 MG/G (ref 0–30)
POTASSIUM SERPL-SCNC: 4.2 MMOL/L (ref 3.6–5.5)
PROT SERPL-MCNC: 7.6 G/DL (ref 6–8.2)
SODIUM SERPL-SCNC: 142 MMOL/L (ref 135–145)
TRIGL SERPL-MCNC: 395 MG/DL (ref 0–149)

## 2025-05-24 PROCEDURE — 36415 COLL VENOUS BLD VENIPUNCTURE: CPT

## 2025-05-24 PROCEDURE — 80061 LIPID PANEL: CPT

## 2025-05-24 PROCEDURE — 80053 COMPREHEN METABOLIC PANEL: CPT

## 2025-05-24 PROCEDURE — 82043 UR ALBUMIN QUANTITATIVE: CPT

## 2025-05-24 PROCEDURE — 83036 HEMOGLOBIN GLYCOSYLATED A1C: CPT

## 2025-05-24 PROCEDURE — 82570 ASSAY OF URINE CREATININE: CPT

## 2025-08-12 ENCOUNTER — NON-PROVIDER VISIT (OUTPATIENT)
Dept: CARDIOLOGY | Facility: MEDICAL CENTER | Age: 65
End: 2025-08-12
Attending: INTERNAL MEDICINE
Payer: COMMERCIAL

## 2025-08-12 DIAGNOSIS — R00.2 PALPITATIONS: Primary | ICD-10-CM

## 2025-08-12 PROCEDURE — 93246 EXT ECG>7D<15D RECORDING: CPT

## 2025-08-30 ENCOUNTER — HOSPITAL ENCOUNTER (OUTPATIENT)
Dept: LAB | Facility: MEDICAL CENTER | Age: 65
End: 2025-08-30
Attending: FAMILY MEDICINE
Payer: COMMERCIAL

## 2025-08-30 LAB
ALBUMIN SERPL BCP-MCNC: 4.6 G/DL (ref 3.2–4.9)
ALBUMIN/GLOB SERPL: 1.6 G/DL
ALP SERPL-CCNC: 60 U/L (ref 30–99)
ALT SERPL-CCNC: 74 U/L (ref 2–50)
ANION GAP SERPL CALC-SCNC: 15 MMOL/L (ref 7–16)
AST SERPL-CCNC: 52 U/L (ref 12–45)
BILIRUB SERPL-MCNC: 0.5 MG/DL (ref 0.1–1.5)
BUN SERPL-MCNC: 16 MG/DL (ref 8–22)
CALCIUM ALBUM COR SERPL-MCNC: 8.8 MG/DL (ref 8.5–10.5)
CALCIUM SERPL-MCNC: 9.3 MG/DL (ref 8.5–10.5)
CHLORIDE SERPL-SCNC: 100 MMOL/L (ref 96–112)
CHOLEST SERPL-MCNC: 241 MG/DL (ref 100–199)
CO2 SERPL-SCNC: 22 MMOL/L (ref 20–33)
CREAT SERPL-MCNC: 0.78 MG/DL (ref 0.5–1.4)
EST. AVERAGE GLUCOSE BLD GHB EST-MCNC: 137 MG/DL
FASTING STATUS PATIENT QL REPORTED: NORMAL
GFR SERPLBLD CREATININE-BSD FMLA CKD-EPI: 85 ML/MIN/1.73 M 2
GLOBULIN SER CALC-MCNC: 2.9 G/DL (ref 1.9–3.5)
GLUCOSE SERPL-MCNC: 122 MG/DL (ref 65–99)
HBA1C MFR BLD: 6.4 % (ref 4–5.6)
HDLC SERPL-MCNC: 49 MG/DL
LDLC SERPL CALC-MCNC: 123 MG/DL
POTASSIUM SERPL-SCNC: 4 MMOL/L (ref 3.6–5.5)
PROT SERPL-MCNC: 7.5 G/DL (ref 6–8.2)
SODIUM SERPL-SCNC: 137 MMOL/L (ref 135–145)
TRIGL SERPL-MCNC: 344 MG/DL (ref 0–149)

## 2025-08-30 PROCEDURE — 36415 COLL VENOUS BLD VENIPUNCTURE: CPT

## 2025-08-30 PROCEDURE — 80061 LIPID PANEL: CPT

## 2025-08-30 PROCEDURE — 83036 HEMOGLOBIN GLYCOSYLATED A1C: CPT

## 2025-08-30 PROCEDURE — 80053 COMPREHEN METABOLIC PANEL: CPT
